# Patient Record
Sex: MALE | Race: WHITE | NOT HISPANIC OR LATINO | Employment: FULL TIME | URBAN - NONMETROPOLITAN AREA
[De-identification: names, ages, dates, MRNs, and addresses within clinical notes are randomized per-mention and may not be internally consistent; named-entity substitution may affect disease eponyms.]

---

## 2017-02-21 ENCOUNTER — OFFICE VISIT (OUTPATIENT)
Dept: INTERNAL MEDICINE | Facility: CLINIC | Age: 52
End: 2017-02-21

## 2017-02-21 VITALS
HEIGHT: 69 IN | DIASTOLIC BLOOD PRESSURE: 82 MMHG | SYSTOLIC BLOOD PRESSURE: 120 MMHG | WEIGHT: 188 LBS | RESPIRATION RATE: 14 BRPM | TEMPERATURE: 98.7 F | BODY MASS INDEX: 27.85 KG/M2 | OXYGEN SATURATION: 97 % | HEART RATE: 78 BPM

## 2017-02-21 DIAGNOSIS — M19.90 ARTHRITIS: ICD-10-CM

## 2017-02-21 DIAGNOSIS — G43.809 OTHER TYPE OF MIGRAINE: ICD-10-CM

## 2017-02-21 DIAGNOSIS — Z00.00 HEALTH CARE MAINTENANCE: ICD-10-CM

## 2017-02-21 DIAGNOSIS — I10 ESSENTIAL HYPERTENSION: ICD-10-CM

## 2017-02-21 DIAGNOSIS — N40.1 BENIGN NON-NODULAR PROSTATIC HYPERPLASIA WITH LOWER URINARY TRACT SYMPTOMS: ICD-10-CM

## 2017-02-21 DIAGNOSIS — K21.9 GASTROESOPHAGEAL REFLUX DISEASE WITHOUT ESOPHAGITIS: Primary | ICD-10-CM

## 2017-02-21 PROBLEM — G43.909 MIGRAINE HEADACHE: Status: ACTIVE | Noted: 2017-02-21

## 2017-02-21 PROCEDURE — 99203 OFFICE O/P NEW LOW 30 MIN: CPT | Performed by: INTERNAL MEDICINE

## 2017-02-21 NOTE — PROGRESS NOTES
Subjective   Prince Wesley is a 51 y.o. male.     Chief Complaint   Patient presents with   • Establish Care     New pt        History of Present Illness   Patient here to establish care.  Complaining heartburn off and on taking over-the-counter Nexium as needed helps.  Denies any nausea vomiting diarrhea.  History of hypertension blood pressure normal without medication now.  Weight is high.  Complaining decreased force of urination.  Family history of prostate cancer.  Also complains left arm numbness tingling with certain position denies any pain.    No current outpatient prescriptions on file.    The following portions of the patient's history were reviewed and updated as appropriate: allergies, current medications, past family history, past medical history, past social history, past surgical history and problem list.    Review of Systems   Constitutional: Negative.    Respiratory: Negative.    Cardiovascular: Negative.    Gastrointestinal: Negative.    Musculoskeletal: Negative.    Skin: Negative.    Neurological: Negative.    Psychiatric/Behavioral: Negative.        Objective   Physical Exam   Constitutional: He is oriented to person, place, and time. He appears well-nourished.   Neck: Neck supple.   Cardiovascular: Normal rate, regular rhythm and normal heart sounds.    Pulmonary/Chest: Effort normal and breath sounds normal.   Abdominal: Bowel sounds are normal.   Neurological: He is alert and oriented to person, place, and time.   Skin: Skin is warm.   Psychiatric: He has a normal mood and affect.       All tests have been reviewed.    Assessment/Plan   Diagnoses and all orders for this visit:    Gastroesophageal reflux disease without esophagitis, do lab     Essential hypertension diet for now    Other type of migraine watch    Benign non-nodular prostatic hyperplasia with lower urinary tract symptoms, FH prostate ca, do lab    Arthritis watch     schedule colon     Left arm numbness with certain position.  Watch posture.     Overweight , diet           1mo PE after labs

## 2017-11-17 ENCOUNTER — TELEPHONE (OUTPATIENT)
Dept: INTERNAL MEDICINE | Facility: CLINIC | Age: 52
End: 2017-11-17

## 2017-11-17 NOTE — TELEPHONE ENCOUNTER
Patient called the office requesting to see if he could get a refill on   Losartan 50mg  Twice daily   The patient stated that he will be out over the weekened of the Rx and would like to see if any could get sent to the pharmacy that is on file until his next appt.

## 2017-11-20 RX ORDER — LOSARTAN POTASSIUM 50 MG/1
50 TABLET ORAL DAILY
Qty: 60 TABLET | Refills: 0 | Status: SHIPPED | OUTPATIENT
Start: 2017-11-20 | End: 2018-01-25 | Stop reason: SDUPTHER

## 2018-01-25 ENCOUNTER — TELEPHONE (OUTPATIENT)
Dept: INTERNAL MEDICINE | Facility: CLINIC | Age: 53
End: 2018-01-25

## 2018-01-25 RX ORDER — LOSARTAN POTASSIUM 50 MG/1
50 TABLET ORAL DAILY
Qty: 60 TABLET | Refills: 0 | Status: SHIPPED | OUTPATIENT
Start: 2018-01-25 | End: 2018-05-15 | Stop reason: SDUPTHER

## 2018-05-15 RX ORDER — LOSARTAN POTASSIUM 50 MG/1
50 TABLET ORAL DAILY
Qty: 30 TABLET | Refills: 0 | Status: SHIPPED | OUTPATIENT
Start: 2018-05-15 | End: 2018-08-14 | Stop reason: SDUPTHER

## 2018-08-14 ENCOUNTER — OFFICE VISIT (OUTPATIENT)
Dept: INTERNAL MEDICINE | Facility: CLINIC | Age: 53
End: 2018-08-14

## 2018-08-14 VITALS
HEIGHT: 69 IN | SYSTOLIC BLOOD PRESSURE: 150 MMHG | WEIGHT: 196 LBS | HEART RATE: 90 BPM | BODY MASS INDEX: 29.03 KG/M2 | OXYGEN SATURATION: 96 % | TEMPERATURE: 98.2 F | DIASTOLIC BLOOD PRESSURE: 80 MMHG

## 2018-08-14 DIAGNOSIS — I10 ESSENTIAL HYPERTENSION: Primary | ICD-10-CM

## 2018-08-14 PROCEDURE — 99214 OFFICE O/P EST MOD 30 MIN: CPT | Performed by: INTERNAL MEDICINE

## 2018-08-14 RX ORDER — LOSARTAN POTASSIUM 50 MG/1
50 TABLET ORAL DAILY
Qty: 90 TABLET | Refills: 3 | Status: SHIPPED | OUTPATIENT
Start: 2018-08-14 | End: 2018-08-29

## 2018-08-14 RX ORDER — CITALOPRAM 10 MG/1
10 TABLET ORAL DAILY
Qty: 30 TABLET | Refills: 1 | Status: SHIPPED | OUTPATIENT
Start: 2018-08-14 | End: 2018-08-29 | Stop reason: SDUPTHER

## 2018-08-14 NOTE — PROGRESS NOTES
Subjective   Prince Wesley is a 52 y.o. male.     Chief Complaint   Patient presents with   • Hypertension   • Anxiety       Anxiety   Presents for initial visit. Onset was 1 to 5 years ago. The problem has been unchanged. Symptoms include excessive worry, irritability, nervous/anxious behavior and restlessness. Patient reports no chest pain, confusion, decreased concentration, depressed mood, dizziness, feeling of choking, obsessions, shortness of breath or suicidal ideas. Symptoms occur most days. The severity of symptoms is mild. The symptoms are aggravated by work stress. The quality of sleep is good. Nighttime awakenings: one to two.     Risk factors include alcohol intake and family history. Past treatments include nothing.      BP high, ran out medicine. No HA or soa.     Current Outpatient Prescriptions:   •  losartan (COZAAR) 50 MG tablet, Take 1 tablet by mouth Daily., Disp: 90 tablet, Rfl: 3    The following portions of the patient's history were reviewed and updated as appropriate: allergies, current medications, past family history, past medical history, past social history, past surgical history and problem list.    Review of Systems   Constitutional: Positive for irritability.   Respiratory: Negative.  Negative for shortness of breath.    Cardiovascular: Negative for chest pain.   Gastrointestinal: Negative.    Musculoskeletal: Negative.    Skin: Negative.    Neurological: Negative.  Negative for dizziness.   Psychiatric/Behavioral: Negative for confusion, decreased concentration and suicidal ideas. The patient is nervous/anxious.        Objective   Physical Exam   Constitutional: He is oriented to person, place, and time. He appears well-nourished.   Neck: Neck supple.   Cardiovascular: Normal rate, regular rhythm and normal heart sounds.    Pulmonary/Chest: Effort normal and breath sounds normal.   Abdominal: Bowel sounds are normal.   Neurological: He is alert and oriented to person, place, and time.    Skin: Skin is warm.   Psychiatric:   anxious       All tests have been reviewed.    Assessment/Plan   Diagnoses and all orders for this visit:    Essential hypertension  -     losartan (COZAAR) 50 MG tablet; Take 1 tablet by mouth Daily.              Gastroesophageal reflux disease without esophagitis, patient did not do lab      Essential hypertension continue med refill  Anxiety start celexa      Other type of migraine watch     Benign non-nodular prostatic hyperplasia with lower urinary tract symptoms, FH prostate ca, do lab     Arthritis watch    Left arm numbness with certain position. Watch posture.      Overweight , diet  2 week ,                 1mo PE after labs

## 2018-08-29 ENCOUNTER — OFFICE VISIT (OUTPATIENT)
Dept: INTERNAL MEDICINE | Facility: CLINIC | Age: 53
End: 2018-08-29

## 2018-08-29 VITALS
SYSTOLIC BLOOD PRESSURE: 160 MMHG | HEIGHT: 69 IN | OXYGEN SATURATION: 98 % | TEMPERATURE: 98.3 F | BODY MASS INDEX: 28.73 KG/M2 | WEIGHT: 194 LBS | HEART RATE: 78 BPM | RESPIRATION RATE: 14 BRPM | DIASTOLIC BLOOD PRESSURE: 98 MMHG

## 2018-08-29 DIAGNOSIS — M19.90 ARTHRITIS: ICD-10-CM

## 2018-08-29 DIAGNOSIS — I10 ESSENTIAL HYPERTENSION: Primary | ICD-10-CM

## 2018-08-29 DIAGNOSIS — N40.1 BENIGN NON-NODULAR PROSTATIC HYPERPLASIA WITH LOWER URINARY TRACT SYMPTOMS: ICD-10-CM

## 2018-08-29 DIAGNOSIS — K21.9 GASTROESOPHAGEAL REFLUX DISEASE WITHOUT ESOPHAGITIS: ICD-10-CM

## 2018-08-29 DIAGNOSIS — G43.809 OTHER MIGRAINE WITHOUT STATUS MIGRAINOSUS, NOT INTRACTABLE: ICD-10-CM

## 2018-08-29 PROCEDURE — 99214 OFFICE O/P EST MOD 30 MIN: CPT | Performed by: INTERNAL MEDICINE

## 2018-08-29 RX ORDER — LOSARTAN POTASSIUM AND HYDROCHLOROTHIAZIDE 25; 100 MG/1; MG/1
1 TABLET ORAL DAILY
Qty: 30 TABLET | Refills: 2 | Status: SHIPPED | OUTPATIENT
Start: 2018-08-29 | End: 2018-12-20 | Stop reason: SDUPTHER

## 2018-08-29 RX ORDER — CITALOPRAM 20 MG/1
20 TABLET ORAL DAILY
Qty: 30 TABLET | Refills: 2 | Status: SHIPPED | OUTPATIENT
Start: 2018-08-29 | End: 2018-12-20 | Stop reason: SDUPTHER

## 2018-08-29 NOTE — PROGRESS NOTES
Subjective   Prince Wesley is a 53 y.o. male.     Chief Complaint   Patient presents with   • Follow-up     on meds        History of Present Illness   GERD patient yet to do blood tests.  Hypertension on medication blood pressure still elevated.  Anxiety on medication helps some.  Migraine headache is stable.  BPH is stable.  Arthritis joint pain from time to time.  Weight is still elevated.    Current Outpatient Prescriptions:   •  citalopram (CELEXA) 10 MG tablet, Take 1 tablet by mouth Daily., Disp: 30 tablet, Rfl: 1  •  losartan (COZAAR) 50 MG tablet, Take 1 tablet by mouth Daily., Disp: 90 tablet, Rfl: 3    The following portions of the patient's history were reviewed and updated as appropriate: allergies, current medications, past family history, past medical history, past social history, past surgical history and problem list.    Review of Systems   Constitutional: Negative.    Respiratory: Negative.    Cardiovascular: Negative.    Gastrointestinal: Negative.    Skin: Negative.    Psychiatric/Behavioral: The patient is nervous/anxious.        Objective   Physical Exam   Constitutional: He is oriented to person, place, and time. He appears well-developed and well-nourished.   Neck: Neck supple.   Cardiovascular: Normal rate, regular rhythm and normal heart sounds.    Pulmonary/Chest: Effort normal and breath sounds normal.   Abdominal: Soft. Bowel sounds are normal.   Neurological: He is alert and oriented to person, place, and time.   Psychiatric: He has a normal mood and affect. His behavior is normal.       All tests have been reviewed.    Assessment/Plan   There are no diagnoses linked to this encounter.           Gastroesophageal reflux disease without esophagitis, patient did not do lab      Essential hypertension change to losartan /25 from losartan 50  Anxiety celexa increased to 20 mg     Other type of migraine watch     Benign non-nodular prostatic hyperplasia with lower urinary tract symptoms,  FH prostate ca, do lab     Arthritis watch     Left arm numbness with certain position. Watch posture.      Overweight , diet  4 week ,PE after lab

## 2018-12-20 RX ORDER — LOSARTAN POTASSIUM AND HYDROCHLOROTHIAZIDE 25; 100 MG/1; MG/1
1 TABLET ORAL DAILY
Qty: 30 TABLET | Refills: 2 | Status: SHIPPED | OUTPATIENT
Start: 2018-12-20 | End: 2019-10-22

## 2018-12-20 RX ORDER — CITALOPRAM 20 MG/1
20 TABLET ORAL DAILY
Qty: 30 TABLET | Refills: 2 | Status: SHIPPED | OUTPATIENT
Start: 2018-12-20 | End: 2019-10-22 | Stop reason: SDDI

## 2018-12-20 NOTE — TELEPHONE ENCOUNTER
Patient lvm stating he will not be able to make an appt. Until Feb.  requesting refills to last until then.

## 2019-10-22 ENCOUNTER — RESULTS ENCOUNTER (OUTPATIENT)
Dept: FAMILY MEDICINE CLINIC | Facility: CLINIC | Age: 54
End: 2019-10-22

## 2019-10-22 ENCOUNTER — OFFICE VISIT (OUTPATIENT)
Dept: FAMILY MEDICINE CLINIC | Facility: CLINIC | Age: 54
End: 2019-10-22

## 2019-10-22 VITALS
DIASTOLIC BLOOD PRESSURE: 102 MMHG | BODY MASS INDEX: 30.66 KG/M2 | SYSTOLIC BLOOD PRESSURE: 172 MMHG | OXYGEN SATURATION: 99 % | WEIGHT: 207 LBS | TEMPERATURE: 98.6 F | HEART RATE: 83 BPM | HEIGHT: 69 IN

## 2019-10-22 DIAGNOSIS — Z12.11 COLON CANCER SCREENING: ICD-10-CM

## 2019-10-22 DIAGNOSIS — I10 UNCONTROLLED HYPERTENSION: Primary | ICD-10-CM

## 2019-10-22 DIAGNOSIS — L50.1 IDIOPATHIC URTICARIA: ICD-10-CM

## 2019-10-22 DIAGNOSIS — G43.109 MIGRAINE WITH AURA AND WITHOUT STATUS MIGRAINOSUS, NOT INTRACTABLE: ICD-10-CM

## 2019-10-22 DIAGNOSIS — Z13.220 SCREENING FOR LIPID DISORDERS: ICD-10-CM

## 2019-10-22 DIAGNOSIS — R06.83 SNORING: ICD-10-CM

## 2019-10-22 DIAGNOSIS — R12 HEARTBURN: ICD-10-CM

## 2019-10-22 DIAGNOSIS — Z13.1 SCREENING FOR DIABETES MELLITUS: ICD-10-CM

## 2019-10-22 DIAGNOSIS — Z12.5 SCREENING FOR PROSTATE CANCER: ICD-10-CM

## 2019-10-22 DIAGNOSIS — Z80.42 FAMILY HISTORY OF PROSTATE CANCER IN FATHER: ICD-10-CM

## 2019-10-22 DIAGNOSIS — Z28.21 INFLUENZA VACCINATION DECLINED BY PATIENT: ICD-10-CM

## 2019-10-22 PROCEDURE — 99204 OFFICE O/P NEW MOD 45 MIN: CPT | Performed by: FAMILY MEDICINE

## 2019-10-22 RX ORDER — VALSARTAN AND HYDROCHLOROTHIAZIDE 160; 25 MG/1; MG/1
1 TABLET ORAL DAILY
Qty: 30 TABLET | Refills: 5 | Status: SHIPPED | OUTPATIENT
Start: 2019-10-22 | End: 2020-03-04 | Stop reason: SDUPTHER

## 2019-10-22 NOTE — PROGRESS NOTES
Subjective   Prince Wesley is a 54 y.o. male.     History of Present Illness  Mr. Wesley presents today as a new pt to establish care. He is in need of medication refills.    He has essential hypertension, dx'd few years ago. Previously on Hyzaar which worked well but not had refills in approx 3 months. BP has run as high as 190s systolic. Not getting regular exercise. Not following heart healthy diet. Denies side effects on hyzaar. Is concerned about supply with recent recalls. Denies h/o CHF, CAD, dysrhythmia, valvular disease. Denies CP, palpitations, swelling. Does have mild DA SILVA which has been stable.    He snores loudly, was told previously he may need a sleep study few years ago but was not able to afford the deductible at the time.    Has family h/o prostate cancer in father and PGF. Has seen Dr. Moffett in past had PSA, SYLVIA. Been over a year since last PSA. Denies obstructive symptoms.    He is overdue for colon cancer screening. Wishes to avoid colonoscopy if possible as first step.    He was previously on celexa for situational anxiety. Not been taking for at least 3 months. Feels he is doing well without it at this time. Denies depression.    Has had migraine with aura in past. Began approx 15 yrs ago. Has less than 1 per year. Generally with scintillating scotomata. Uses tylenol, NSAID with good response. No assoc'd focal neuro symptoms.    Has had recurrent urticaria with unknown trigger. Was previously assoc'd with increased stress, but not always. tx'd with benadryl, steroids and does well. Denies h/o angioedema, anaphylaxis. Does believe it began after starting ARB for HTN.    Has heartburn for which he is taking PPI every 3-4 days as needed. No dysphagia, weight loss, blood in stool or abd pain.    The following portions of the patient's history were reviewed and updated as appropriate: allergies, current medications, past family history, past medical history, past social history, past surgical history  and problem list.    Review of Systems   Constitutional: Negative for appetite change, fatigue, fever and unexpected weight change.   HENT: Negative for congestion, ear pain, hearing loss, nosebleeds, rhinorrhea, sneezing, sore throat, tinnitus and trouble swallowing.    Eyes: Positive for visual disturbance. Negative for pain, discharge and redness.   Respiratory: Positive for shortness of breath (with exertion). Negative for cough, chest tightness and wheezing.    Cardiovascular: Negative for chest pain, palpitations and leg swelling.   Gastrointestinal: Negative for abdominal pain, blood in stool, constipation, diarrhea, nausea and vomiting.        Heartburn   Endocrine: Negative for cold intolerance, heat intolerance, polydipsia and polyuria.   Genitourinary: Negative for dysuria, flank pain, frequency, hematuria and urgency.   Musculoskeletal: Positive for arthralgias. Negative for back pain, joint swelling, myalgias and neck pain.   Skin: Negative for rash and wound.   Neurological: Negative for dizziness, tremors, seizures, syncope, speech difficulty, weakness, numbness and headaches.   Hematological: Negative for adenopathy. Does not bruise/bleed easily.   Psychiatric/Behavioral: Negative for confusion, dysphoric mood and sleep disturbance. The patient is nervous/anxious.        Objective    Vitals:    10/22/19 0938   BP: (!) 172/102   Pulse: 83   Temp: 98.6 °F (37 °C)   SpO2: 99%     Body mass index is 30.57 kg/m².      10/22/19  0938   Weight: 93.9 kg (207 lb)       Physical Exam   Constitutional: He is oriented to person, place, and time. He appears well-developed and well-nourished. He is cooperative. He does not appear ill. No distress.   HENT:   Head: Normocephalic and atraumatic.   Right Ear: Tympanic membrane, external ear and ear canal normal.   Left Ear: Tympanic membrane, external ear and ear canal normal.   Nose: Nose normal. No mucosal edema or rhinorrhea.   Mouth/Throat: Oropharynx is clear and  moist and mucous membranes are normal. No oral lesions. No posterior oropharyngeal erythema.   Mallampati Class 3   Eyes: Conjunctivae, EOM and lids are normal.   Neck: Phonation normal. Neck supple. Normal carotid pulses present. Carotid bruit is not present. No thyromegaly present.   Cardiovascular: Normal rate, regular rhythm, S1 normal, S2 normal and intact distal pulses. Exam reveals no gallop.   No murmur heard.  Pulmonary/Chest: Effort normal and breath sounds normal.   Abdominal: Soft. Bowel sounds are normal. He exhibits no distension and no mass. There is no hepatosplenomegaly. There is no tenderness.   Musculoskeletal: Normal range of motion. He exhibits no edema, tenderness or deformity.     Vascular Status -  His right foot exhibits no edema. His left foot exhibits no edema.  Lymphadenopathy:     He has no cervical adenopathy.   Neurological: He is alert and oriented to person, place, and time. He has normal strength. He displays no tremor. No cranial nerve deficit or sensory deficit. Gait normal.   Skin: Skin is warm and dry. No ecchymosis and no rash noted. No cyanosis. Nails show no clubbing.   Psychiatric: He has a normal mood and affect. His speech is normal and behavior is normal. Judgment and thought content normal. Cognition and memory are normal.   Good eye contact. Answers questions appropriately. Good personal hygiene and grooming.   Nursing note and vitals reviewed.      Assessment/Plan   Prince was seen today for establish care and hypertension.    Diagnoses and all orders for this visit:    Uncontrolled hypertension  -     valsartan-hydrochlorothiazide (DIOVAN HCT) 160-25 MG per tablet; Take 1 tablet by mouth Daily.  -     CBC (No Diff); Future  -     Comprehensive Metabolic Panel; Future  -     TSH Rfx On Abnormal To Free T4; Future  -     Home Sleep Study; Future    Snoring  -     Home Sleep Study; Future    Screening for lipid disorders  -     Lipid Panel; Future    Screening for  diabetes mellitus  -     Hemoglobin A1c; Future    Family history of prostate cancer in father  -     PSA Screen; Future    Screening for prostate cancer  -     PSA Screen; Future    Colon cancer screening  -     Cologuard - Stool, Per Rectum; Future    Heartburn    Migraine with aura and without status migrainosus, not intractable    Idiopathic urticaria    Influenza vaccination declined by patient       Uncontrolled HTN as he has been off ARB/thiazide combo for few months. Restart with Diovan HCT due to shortage of Hyzaar. I suspect he may have comorbid JULIET. Home sleep study as above. Discussed need for heart healthy eating, regular aerobic exercise and limiting alcohol intake to no more than 2 servings per day.     Heartburn well controlled on H2 blocker or PPI every few days as needed. No red flag symptoms. Risks/benefits and potential side effects reviewed.    Migraine with aura managed well with prn analgesics. Headaches very infrequent at this time. No focal neuro symptoms.    Recurrent urticaria of unknown cause. Possible related to ARB use so this will have to be closely monitored. Continue benadryl and corticosteroids, H2 blockers for recurrence.    Return in 1 month for BP recheck. F/u sooner as needed/instructed.  I will contact patient regarding test results and provide instructions regarding any necessary changes in plan of care.  Patient was encouraged to keep me informed of any acute changes, lack of improvement, or any new concerning symptoms.  Pt is aware of reasons to seek emergent care.  Patient voiced understanding of all instructions and denied further questions.

## 2019-10-23 ENCOUNTER — RESULTS ENCOUNTER (OUTPATIENT)
Dept: FAMILY MEDICINE CLINIC | Facility: CLINIC | Age: 54
End: 2019-10-23

## 2019-10-23 DIAGNOSIS — Z80.42 FAMILY HISTORY OF PROSTATE CANCER IN FATHER: ICD-10-CM

## 2019-10-23 DIAGNOSIS — I10 UNCONTROLLED HYPERTENSION: ICD-10-CM

## 2019-10-23 DIAGNOSIS — Z13.220 SCREENING FOR LIPID DISORDERS: ICD-10-CM

## 2019-10-23 DIAGNOSIS — Z13.1 SCREENING FOR DIABETES MELLITUS: ICD-10-CM

## 2019-10-23 DIAGNOSIS — Z12.5 SCREENING FOR PROSTATE CANCER: ICD-10-CM

## 2019-12-02 ENCOUNTER — TELEPHONE (OUTPATIENT)
Dept: FAMILY MEDICINE CLINIC | Facility: CLINIC | Age: 54
End: 2019-12-02

## 2019-12-02 DIAGNOSIS — N20.0 KIDNEY STONE: Primary | ICD-10-CM

## 2019-12-02 NOTE — TELEPHONE ENCOUNTER
Pt called. Sts he wants to come in later this week to have labs drawn before his appt on the 16th (orders already in Epic) - but would like to have a urine test added to the labs if possible.  Sts that he thinks he may have passed a kidney stone this weekend (had pain, blood in urine, etc), but is feeling better now.  He wants to have the UA to check to be sure everything is ok. Please advise.

## 2019-12-04 ENCOUNTER — RESULTS ENCOUNTER (OUTPATIENT)
Dept: FAMILY MEDICINE CLINIC | Facility: CLINIC | Age: 54
End: 2019-12-04

## 2019-12-04 DIAGNOSIS — N20.0 KIDNEY STONE: ICD-10-CM

## 2019-12-04 LAB
ALBUMIN SERPL-MCNC: 4.8 G/DL (ref 3.5–5.2)
ALBUMIN/GLOB SERPL: 1.7 G/DL
ALP SERPL-CCNC: 70 U/L (ref 39–117)
ALT SERPL-CCNC: 32 U/L (ref 1–41)
AST SERPL-CCNC: 16 U/L (ref 1–40)
BILIRUB SERPL-MCNC: 0.5 MG/DL (ref 0.2–1.2)
BUN SERPL-MCNC: 10 MG/DL (ref 6–20)
BUN/CREAT SERPL: 11.4 (ref 7–25)
CALCIUM SERPL-MCNC: 9.6 MG/DL (ref 8.6–10.5)
CHLORIDE SERPL-SCNC: 101 MMOL/L (ref 98–107)
CHOLEST SERPL-MCNC: 240 MG/DL (ref 0–200)
CO2 SERPL-SCNC: 27.2 MMOL/L (ref 22–29)
CREAT SERPL-MCNC: 0.88 MG/DL (ref 0.76–1.27)
ERYTHROCYTE [DISTWIDTH] IN BLOOD BY AUTOMATED COUNT: 14 % (ref 12.3–15.4)
GLOBULIN SER CALC-MCNC: 2.8 GM/DL
GLUCOSE SERPL-MCNC: 100 MG/DL (ref 65–99)
HBA1C MFR BLD: 6.3 % (ref 4.8–5.6)
HCT VFR BLD AUTO: 51 % (ref 37.5–51)
HDLC SERPL-MCNC: 48 MG/DL (ref 40–60)
HGB BLD-MCNC: 16.6 G/DL (ref 13–17.7)
LDLC SERPL CALC-MCNC: 152 MG/DL (ref 0–100)
MCH RBC QN AUTO: 27.4 PG (ref 26.6–33)
MCHC RBC AUTO-ENTMCNC: 32.5 G/DL (ref 31.5–35.7)
MCV RBC AUTO: 84.3 FL (ref 79–97)
PLATELET # BLD AUTO: 270 10*3/MM3 (ref 140–450)
POTASSIUM SERPL-SCNC: 4.8 MMOL/L (ref 3.5–5.2)
PROT SERPL-MCNC: 7.6 G/DL (ref 6–8.5)
PSA SERPL-MCNC: 7.22 NG/ML (ref 0–4)
RBC # BLD AUTO: 6.05 10*6/MM3 (ref 4.14–5.8)
SODIUM SERPL-SCNC: 141 MMOL/L (ref 136–145)
TRIGL SERPL-MCNC: 198 MG/DL (ref 0–150)
TSH SERPL DL<=0.005 MIU/L-ACNC: 2.62 UIU/ML (ref 0.27–4.2)
VLDLC SERPL CALC-MCNC: 39.6 MG/DL
WBC # BLD AUTO: 6.73 10*3/MM3 (ref 3.4–10.8)

## 2019-12-17 ENCOUNTER — OFFICE VISIT (OUTPATIENT)
Dept: FAMILY MEDICINE CLINIC | Facility: CLINIC | Age: 54
End: 2019-12-17

## 2019-12-17 ENCOUNTER — RESULTS ENCOUNTER (OUTPATIENT)
Dept: FAMILY MEDICINE CLINIC | Facility: CLINIC | Age: 54
End: 2019-12-17

## 2019-12-17 VITALS
WEIGHT: 204 LBS | OXYGEN SATURATION: 99 % | HEIGHT: 69 IN | BODY MASS INDEX: 30.21 KG/M2 | HEART RATE: 79 BPM | SYSTOLIC BLOOD PRESSURE: 124 MMHG | DIASTOLIC BLOOD PRESSURE: 86 MMHG | TEMPERATURE: 98.6 F

## 2019-12-17 DIAGNOSIS — R31.0 GROSS HEMATURIA: ICD-10-CM

## 2019-12-17 DIAGNOSIS — R97.20 ELEVATED PSA, LESS THAN 10 NG/ML: ICD-10-CM

## 2019-12-17 DIAGNOSIS — I10 ESSENTIAL HYPERTENSION: ICD-10-CM

## 2019-12-17 DIAGNOSIS — Z80.42 FAMILY HISTORY OF PROSTATE CANCER IN FATHER: ICD-10-CM

## 2019-12-17 DIAGNOSIS — E78.2 MIXED HYPERLIPIDEMIA: ICD-10-CM

## 2019-12-17 DIAGNOSIS — R73.03 PREDIABETES: ICD-10-CM

## 2019-12-17 DIAGNOSIS — I10 ESSENTIAL HYPERTENSION: Primary | ICD-10-CM

## 2019-12-17 PROCEDURE — 99214 OFFICE O/P EST MOD 30 MIN: CPT | Performed by: FAMILY MEDICINE

## 2019-12-17 NOTE — PROGRESS NOTES
Subjective   Prince Wesley is a 54 y.o. male.     History of Present Illness   Mr. Wesley presents today for recheck BP. Restarted on meds for HTN last visit. Taking as rx'd. Denies side effects. BP under better control.    Since last visit he performed cologuard test which was normal.    FLP showed high cholesterol and TGs. He does not get regular exercise. Not following heart healthy diet.    He was found to have pre-DM. A1c high. No known family h/o diabetes.    PSA also noted to be elevated. Saw Dr. Moffett over 10 years ago for very mildly elevated PSA, believes just over 4. Father with h/o prostate cancer. He has had once episode of gross hematuria but believes he had a kidney stone as it was assoc'd with pain. No recurrence.     The following portions of the patient's history were reviewed and updated as appropriate: allergies, current medications, past family history, past medical history, past social history, past surgical history and problem list.    Review of Systems   Constitutional: Negative for appetite change, fatigue, fever and unexpected weight change.   HENT: Negative for congestion, ear pain, hearing loss, nosebleeds, rhinorrhea, sneezing, sore throat, tinnitus and trouble swallowing.    Eyes: Positive for visual disturbance. Negative for pain, discharge and redness.   Respiratory: Positive for shortness of breath (with exertion). Negative for cough, chest tightness and wheezing.    Cardiovascular: Negative for chest pain, palpitations and leg swelling.   Gastrointestinal: Negative for abdominal pain, blood in stool, constipation, diarrhea, nausea and vomiting.        Heartburn   Endocrine: Negative for cold intolerance, heat intolerance, polydipsia and polyuria.   Genitourinary: Negative for decreased urine volume, difficulty urinating, dysuria, flank pain, frequency, hematuria and urgency.   Musculoskeletal: Positive for arthralgias. Negative for back pain, joint swelling, myalgias and neck pain.    Skin: Negative for rash and wound.   Neurological: Negative for dizziness, tremors, seizures, syncope, speech difficulty, weakness, numbness and headaches.   Hematological: Negative for adenopathy. Does not bruise/bleed easily.   Psychiatric/Behavioral: Negative for confusion, dysphoric mood and sleep disturbance. The patient is nervous/anxious.    Pt's previous ROS reviewed and updated as indicated.       Objective    Vitals:    12/17/19 0858   BP: 124/86   Pulse: 79   Temp: 98.6 °F (37 °C)   SpO2: 99%     Body mass index is 30.13 kg/m².      12/17/19  0858   Weight: 92.5 kg (204 lb)     Physical Exam   Constitutional: He is oriented to person, place, and time. He appears well-developed and well-nourished. He is cooperative. He does not appear ill. No distress.   Neck: Carotid bruit is not present.   Cardiovascular: Normal rate, regular rhythm, normal heart sounds and intact distal pulses. Exam reveals no gallop.   No murmur heard.  Pulmonary/Chest: Effort normal and breath sounds normal.   Neurological: He is alert and oriented to person, place, and time. Gait normal.   Psychiatric: He has a normal mood and affect. His behavior is normal.   Nursing note and vitals reviewed.    Lab Results   Component Value Date    WBC 6.73 12/03/2019    HGB 16.6 12/03/2019    HCT 51.0 12/03/2019    MCV 84.3 12/03/2019     12/03/2019       Lab Results   Component Value Date    BUN 10 12/03/2019    CREATININE 0.88 12/03/2019    EGFRIFNONA 91 12/03/2019    EGFRIFAFRI 110 12/03/2019    BCR 11.4 12/03/2019    K 4.8 12/03/2019    CO2 27.2 12/03/2019    CALCIUM 9.6 12/03/2019    PROTENTOTREF 7.6 12/03/2019    ALBUMIN 4.80 12/03/2019    LABIL2 1.7 12/03/2019    AST 16 12/03/2019    ALT 32 12/03/2019       Lab Results   Component Value Date    CHLPL 240 (H) 12/03/2019    TRIG 198 (H) 12/03/2019    HDL 48 12/03/2019     (H) 12/03/2019       Lab Results   Component Value Date    HGBA1C 6.30 (H) 12/03/2019       Lab Results    Component Value Date    TSH 2.620 12/03/2019       Lab Results   Component Value Date    PSA 7.220 (H) 12/03/2019         Assessment/Plan   Prince was seen today for hypertension.    Diagnoses and all orders for this visit:    Essential hypertension  -     Microalbumin / Creatinine Urine Ratio - Urine, Clean Catch; Future    Prediabetes    Mixed hyperlipidemia    Elevated PSA, less than 10 ng/ml  -     Ambulatory Referral to Urology    Family history of prostate cancer in father  -     Ambulatory Referral to Urology    Gross hematuria  -     Urinalysis With Microscopic If Indicated (No Culture) - Urine, Clean Catch; Future  -     Ambulatory Referral to Urology      HTN now under control since restarting BP meds, continue diovan HCT. HLP but not currently in statin benefit group. Continue close monitoring. Pt advised to eat a heart healthy diet and get regular aerobic exercise.    In order to prevent diabetes, he is encouraged to:  1) increase physical activity to 150 min per week  2) avoid fast foods, junk foods, sweets, sugar beverages  3) increase vegetables, lean proteins, whole grains and fruits    Refer to urology for further eval of elevated PSA. Return for u/a as he does feel he has time to leave sample today.    Routine f/u in 6 months, sooner as needed/instructed.  Patient was encouraged to keep me informed of any acute changes, lack of improvement, or any new concerning symptoms.  Pt is aware of reasons to seek emergent care.  Patient voiced understanding of all instructions and denied further questions.        I spent 25 minutes face to face with patient with > 50% of the face to face time spent in counseling patient regarding diagnostic results, impressions, prognosis, instructions for management, risk factor reductions, education, and importance of treatment compliance.

## 2020-01-24 ENCOUNTER — OFFICE VISIT (OUTPATIENT)
Dept: UROLOGY | Facility: CLINIC | Age: 55
End: 2020-01-24

## 2020-01-24 VITALS
BODY MASS INDEX: 30.21 KG/M2 | HEART RATE: 91 BPM | TEMPERATURE: 97.8 F | OXYGEN SATURATION: 97 % | WEIGHT: 204 LBS | HEIGHT: 69 IN

## 2020-01-24 DIAGNOSIS — R31.0 GROSS HEMATURIA: ICD-10-CM

## 2020-01-24 DIAGNOSIS — A63.0 CONDYLOMA ACUMINATA: ICD-10-CM

## 2020-01-24 DIAGNOSIS — R97.20 ELEVATED PROSTATE SPECIFIC ANTIGEN (PSA): Primary | ICD-10-CM

## 2020-01-24 LAB
BILIRUB BLD-MCNC: NEGATIVE MG/DL
CLARITY, POC: CLEAR
COLOR UR: YELLOW
GLUCOSE UR STRIP-MCNC: NEGATIVE MG/DL
KETONES UR QL: NEGATIVE
LEUKOCYTE EST, POC: NEGATIVE
NITRITE UR-MCNC: NEGATIVE MG/ML
PH UR: 7 [PH] (ref 5–8)
PROT UR STRIP-MCNC: NEGATIVE MG/DL
RBC # UR STRIP: NEGATIVE /UL
SP GR UR: 1.01 (ref 1–1.03)
UROBILINOGEN UR QL: NORMAL

## 2020-01-24 PROCEDURE — 99244 OFF/OP CNSLTJ NEW/EST MOD 40: CPT | Performed by: UROLOGY

## 2020-01-24 PROCEDURE — 81003 URINALYSIS AUTO W/O SCOPE: CPT | Performed by: UROLOGY

## 2020-01-24 NOTE — PROGRESS NOTES
Chief Complaint  Elevated PSA    Referring Provider  Amy Thomas MD    HPI  Mr. Wesley is a 54 y.o.  male who presents with an elevated PSA to 7.2.  He does have a family history of prostate cancer.  He saw Dr. Moffett 10 + years ago. Never had biopsy.    Patient complains of nocturia x1, gross hematuria. He thinks he had a kidney stone when PSA was taken. He denies infection, but had dysuria and urgency at that time.  Patient denies any urinary Sx today.    Patient denies fevers, chills, nausea, vomiting, constipation, or flank pain.    Past  history is negative for BPH, frequent UTIs, or other renal diseases.     He denies shortness of breath, leg swelling, calf pain or bone pain.    He is .  The penile lesions developed after his divorce, but no new sexual partners.  He denies any urethral discharge or burning today.      IPSS Questionnaire (AUA-7):  Over the past month…    1)  Incomplete Emptying  How often have you had a sensation of not emptying your bladder?  0 - Not at all   2)  Frequency  How often have you had to urinate less than every two hours? 1 - Less than 1 time in 5   3)  Intermittency  How often have you found you stopped and started again several times when you urinated?  0 - Not at all   4) Urgency  How often have you found it difficult to postpone urination?  1 - Less than 1 time in 5   5) Weak Stream  How often have you had a weak urinary stream?  1 - Less than 1 time in 5   6) Straining  How often have you had to push or strain to begin urination?  0 - Not at all   7) Nocturia  How many times did you typically get up at night to urinate?  1 - 1 time   Total Score:  4       Quality of life due to urinary symptoms:  If you were to spend the rest of your life with your urinary condition the way it is now, how would you feel about that? 2-Mostly Satisfied   Urine Leakage (Incontinence) 0-No Leakage     Past Medical History  Past Medical History:   Diagnosis Date   • GERD  "(gastroesophageal reflux disease)    • Headache    • Hypertension        Past Surgical History  Past Surgical History:   Procedure Laterality Date   • SQUAMOUS CELL CARCINOMA EXCISION N/A 3-4 years ago   • WISDOM TOOTH EXTRACTION         Medications    Current Outpatient Medications:   •  valsartan-hydrochlorothiazide (DIOVAN HCT) 160-25 MG per tablet, Take 1 tablet by mouth Daily., Disp: 30 tablet, Rfl: 5    Allergies  No Known Allergies    Social History  Social History     Tobacco Use   • Smoking status: Never Smoker   • Smokeless tobacco: Never Used   Substance Use Topics   • Alcohol use: Yes     Frequency: 4 or more times a week     Drinks per session: 1 or 2   • Drug use: No       Family History  Family History   Problem Relation Age of Onset   • Skin cancer Father    • Prostate cancer Father    • Prostate cancer Paternal Grandfather         Review of Systems  Constitutional: No fevers or chills  Skin: Negative for rash  Endocrine: No heat/cold intolerance   Cardiovascular: Negative for chest pain or dyspnea on exertion  Respiratory: Negative for shortness of breath or wheezing  Gastrointestinal: No constipation, nausea or vomiting  Genitourinary: Negative for new lower urinary tract symptoms, current gross hematuria or dysuria.  Musculoskeletal: No flank pain  Neurological:  Negative for frequent headaches or dizziness  Lymph/Heme: Negative for leg swelling or calf pain.    Physical Exam  Visit Vitals  Pulse 91   Temp 97.8 °F (36.6 °C)   Ht 175.3 cm (69.02\")   Wt 92.5 kg (204 lb)   SpO2 97%   BMI 30.11 kg/m²     Constitutional: NAD, WDWN.   HEENT: NCAT. Conjunctivae normal.  MMM.    Cardiovascular: Regular rate.  Pulmonary/Chest: Respirations are even and non-labored bilaterally.  Abdominal: Soft. No distension, tenderness, masses or guarding. No CVA tenderness.  Neurological: A + O x 3.  Cranial Nerves II-XII grossly intact. Normal gait.  Extremities: ANTHONY x 4, Warm. No clubbing.  No cyanosis.    Skin: Pink, " warm and dry.  No rashes noted.    Genitourinary  Penis: circumcised penis, glans normal, no penile discharge.  Positive condyloma on dorsum of the shaft of the penis, as well as the left ventral side.  3-4 in total  Testes: descended bilaterally, no masses, nontender to palpation. Remainder of scrotal contents normal. No hernia appreciated.  Perineum:  No perineal pain with palpation.  Rectal:  Normal tone, no masses.  Prostate:  35 grams.  Symmetric, non-tender, anodular and no induration.      Labs  Brief Urine Lab Results     None          Lab Results   Component Value Date    PSA 7.220 (H) 12/03/2019         Assessment  Mr. Wesley is a 54 y.o.  male who presents with elevated PSA; he had gross hematuria and urgency at that time when the PSA was drawn.  He also has condyloma of the penile shaft skin.    I explained to the patient that an elevated PSA is a marker of risk of prostate cancer and a prostate biopsy would be the next step in diagnosis. I explained that sampling error can occur with any biopsy and there is a risk of potentially missing a cancer that may be present.  Since he had gross hematuria and some urinary symptoms at the time the PSA was checked, it may have been falsely elevated due to infection.  We will recheck a more sophisticated test as well as work-up his gross hematuria, since his absolutely certain that it was an infection.    I discussed the risks, benefits, and alternatives to prostate biopsy, including hematuria, hematochezia, and hematospermia.  I also discussed the risk of diagnosing a clinically-insignificant prostate cancer.  I discussed the risks of sepsis, which can be minimized by prophylactic antibiotics.       Plan  1. 4k score and CT urogram  2. FU in 2 weeks   3. Likely cystoscopy and TRUS Bx in future  4.  Offered him in office excisional biopsy of the condyloma in the future after we have performed the rest of his work-up.    No follow-ups on file.    Ryley  Gera Bobby MD

## 2020-01-29 ENCOUNTER — HOSPITAL ENCOUNTER (OUTPATIENT)
Dept: CT IMAGING | Facility: HOSPITAL | Age: 55
Discharge: HOME | End: 2020-01-29
Admitting: UROLOGY

## 2020-01-29 DIAGNOSIS — R31.0 GROSS HEMATURIA: ICD-10-CM

## 2020-01-29 LAB — CREAT BLDA-MCNC: 1.1 MG/DL (ref 0.6–1.3)

## 2020-01-29 PROCEDURE — 74178 CT ABD&PLV WO CNTR FLWD CNTR: CPT

## 2020-01-29 PROCEDURE — 25010000002 IOPAMIDOL 61 % SOLUTION: Performed by: UROLOGY

## 2020-01-29 PROCEDURE — 82565 ASSAY OF CREATININE: CPT

## 2020-01-29 RX ADMIN — IOPAMIDOL 100 ML: 612 INJECTION, SOLUTION INTRAVENOUS at 07:30

## 2020-02-10 ENCOUNTER — OFFICE VISIT (OUTPATIENT)
Dept: UROLOGY | Facility: CLINIC | Age: 55
End: 2020-02-10

## 2020-02-10 DIAGNOSIS — R97.20 ELEVATED PROSTATE SPECIFIC ANTIGEN (PSA): Primary | ICD-10-CM

## 2020-02-10 PROCEDURE — 99213 OFFICE O/P EST LOW 20 MIN: CPT | Performed by: UROLOGY

## 2020-02-10 RX ORDER — MAGNESIUM HYDROXIDE 1200 MG/15ML
1 LIQUID ORAL ONCE
Qty: 1 ENEMA | Refills: 0 | Status: SHIPPED | OUTPATIENT
Start: 2020-02-10 | End: 2020-02-10

## 2020-02-10 RX ORDER — CIPROFLOXACIN 500 MG/1
500 TABLET, FILM COATED ORAL 2 TIMES DAILY
Qty: 6 TABLET | Refills: 0 | Status: SHIPPED | OUTPATIENT
Start: 2020-02-10 | End: 2020-06-22

## 2020-02-10 RX ORDER — CEPHALEXIN 500 MG/1
500 CAPSULE ORAL 2 TIMES DAILY
Qty: 6 CAPSULE | Refills: 0 | Status: SHIPPED | OUTPATIENT
Start: 2020-02-10 | End: 2020-02-13

## 2020-03-04 DIAGNOSIS — I10 UNCONTROLLED HYPERTENSION: ICD-10-CM

## 2020-03-04 RX ORDER — VALSARTAN AND HYDROCHLOROTHIAZIDE 160; 25 MG/1; MG/1
1 TABLET ORAL DAILY
Qty: 30 TABLET | Refills: 5 | Status: SHIPPED | OUTPATIENT
Start: 2020-03-04 | End: 2020-06-22 | Stop reason: SDUPTHER

## 2020-03-04 NOTE — TELEPHONE ENCOUNTER
Pt stopped by office req refills on his valsartan 160-25mg. Please send refills to [facility] in [place].

## 2020-03-11 RX ORDER — CITALOPRAM 20 MG/1
20 TABLET ORAL DAILY
Qty: 30 TABLET | Refills: 5 | Status: SHIPPED | OUTPATIENT
Start: 2020-03-11 | End: 2020-06-22 | Stop reason: SDUPTHER

## 2020-06-11 ENCOUNTER — TELEPHONE (OUTPATIENT)
Dept: FAMILY MEDICINE CLINIC | Facility: CLINIC | Age: 55
End: 2020-06-11

## 2020-06-11 ENCOUNTER — RESULTS ENCOUNTER (OUTPATIENT)
Dept: FAMILY MEDICINE CLINIC | Facility: CLINIC | Age: 55
End: 2020-06-11

## 2020-06-11 DIAGNOSIS — R73.03 PREDIABETES: ICD-10-CM

## 2020-06-11 DIAGNOSIS — I10 ESSENTIAL HYPERTENSION: ICD-10-CM

## 2020-06-11 DIAGNOSIS — E78.2 MIXED HYPERLIPIDEMIA: ICD-10-CM

## 2020-06-11 DIAGNOSIS — R73.03 PREDIABETES: Primary | ICD-10-CM

## 2020-06-11 NOTE — TELEPHONE ENCOUNTER
Patient contacted the office about having labs do today.      He would like to have a psa added to all of his routine blood work.    Patient has an appointment scheduled for 6/16/2020

## 2020-06-11 NOTE — TELEPHONE ENCOUNTER
He is followed by Dr. Bobby who is closely monitoring an elevated PSA level. Dr. Bobby may have a particular PSA test he may want. I am happy to add to his labs but he needs to know exactly what is needed for Dr. Bobby.    Other lab orders sent in.

## 2020-06-12 DIAGNOSIS — R97.20 ELEVATED PROSTATE SPECIFIC ANTIGEN (PSA): Primary | ICD-10-CM

## 2020-06-18 ENCOUNTER — LAB (OUTPATIENT)
Dept: FAMILY MEDICINE CLINIC | Facility: CLINIC | Age: 55
End: 2020-06-18

## 2020-06-19 LAB
ALBUMIN SERPL-MCNC: 4.6 G/DL (ref 3.5–5.2)
ALBUMIN/GLOB SERPL: 1.8 G/DL
ALP SERPL-CCNC: 70 U/L (ref 39–117)
ALT SERPL-CCNC: 20 U/L (ref 1–41)
AST SERPL-CCNC: 18 U/L (ref 1–40)
BILIRUB SERPL-MCNC: 0.8 MG/DL (ref 0.2–1.2)
BUN SERPL-MCNC: 9 MG/DL (ref 6–20)
BUN/CREAT SERPL: 9.7 (ref 7–25)
CALCIUM SERPL-MCNC: 9.6 MG/DL (ref 8.6–10.5)
CHLORIDE SERPL-SCNC: 97 MMOL/L (ref 98–107)
CHOLEST SERPL-MCNC: 169 MG/DL (ref 0–200)
CO2 SERPL-SCNC: 27.2 MMOL/L (ref 22–29)
CREAT SERPL-MCNC: 0.93 MG/DL (ref 0.76–1.27)
ERYTHROCYTE [DISTWIDTH] IN BLOOD BY AUTOMATED COUNT: 14 % (ref 12.3–15.4)
GLOBULIN SER CALC-MCNC: 2.5 GM/DL
GLUCOSE SERPL-MCNC: 88 MG/DL (ref 65–99)
HBA1C MFR BLD: 5.8 % (ref 4.8–5.6)
HCT VFR BLD AUTO: 48.6 % (ref 37.5–51)
HDLC SERPL-MCNC: 49 MG/DL (ref 40–60)
HGB BLD-MCNC: 16.2 G/DL (ref 13–17.7)
LDLC SERPL CALC-MCNC: 97 MG/DL (ref 0–100)
MCH RBC QN AUTO: 28.9 PG (ref 26.6–33)
MCHC RBC AUTO-ENTMCNC: 33.3 G/DL (ref 31.5–35.7)
MCV RBC AUTO: 86.6 FL (ref 79–97)
PLATELET # BLD AUTO: 243 10*3/MM3 (ref 140–450)
POTASSIUM SERPL-SCNC: 4.3 MMOL/L (ref 3.5–5.2)
PROT SERPL-MCNC: 7.1 G/DL (ref 6–8.5)
RBC # BLD AUTO: 5.61 10*6/MM3 (ref 4.14–5.8)
SODIUM SERPL-SCNC: 137 MMOL/L (ref 136–145)
TRIGL SERPL-MCNC: 113 MG/DL (ref 0–150)
VLDLC SERPL CALC-MCNC: 22.6 MG/DL
WBC # BLD AUTO: 8.27 10*3/MM3 (ref 3.4–10.8)

## 2020-06-22 ENCOUNTER — OFFICE VISIT (OUTPATIENT)
Dept: FAMILY MEDICINE CLINIC | Facility: CLINIC | Age: 55
End: 2020-06-22

## 2020-06-22 VITALS
TEMPERATURE: 97.8 F | BODY MASS INDEX: 27.4 KG/M2 | WEIGHT: 185 LBS | SYSTOLIC BLOOD PRESSURE: 132 MMHG | HEART RATE: 81 BPM | HEIGHT: 69 IN | OXYGEN SATURATION: 97 % | RESPIRATION RATE: 12 BRPM | DIASTOLIC BLOOD PRESSURE: 80 MMHG

## 2020-06-22 DIAGNOSIS — R00.2 PALPITATIONS: ICD-10-CM

## 2020-06-22 DIAGNOSIS — F41.9 ANXIETY: ICD-10-CM

## 2020-06-22 DIAGNOSIS — R73.03 PREDIABETES: ICD-10-CM

## 2020-06-22 DIAGNOSIS — I10 ESSENTIAL HYPERTENSION: Primary | ICD-10-CM

## 2020-06-22 DIAGNOSIS — R97.20 ELEVATED PSA, LESS THAN 10 NG/ML: ICD-10-CM

## 2020-06-22 PROBLEM — E78.2 MIXED HYPERLIPIDEMIA: Status: RESOLVED | Noted: 2019-12-17 | Resolved: 2020-06-22

## 2020-06-22 PROCEDURE — 99214 OFFICE O/P EST MOD 30 MIN: CPT | Performed by: FAMILY MEDICINE

## 2020-06-22 PROCEDURE — 93000 ELECTROCARDIOGRAM COMPLETE: CPT | Performed by: FAMILY MEDICINE

## 2020-06-22 RX ORDER — CITALOPRAM 20 MG/1
20 TABLET ORAL DAILY
Qty: 30 TABLET | Refills: 5 | Status: SHIPPED | OUTPATIENT
Start: 2020-06-22 | End: 2021-07-19

## 2020-06-22 RX ORDER — VALSARTAN AND HYDROCHLOROTHIAZIDE 160; 25 MG/1; MG/1
1 TABLET ORAL DAILY
Qty: 30 TABLET | Refills: 5 | Status: SHIPPED | OUTPATIENT
Start: 2020-06-22 | End: 2020-11-23 | Stop reason: SDUPTHER

## 2020-06-22 NOTE — PROGRESS NOTES
"Subjective   Prince Wesley is a 54 y.o. male.     History of Present Illness   Mr. wesley presents today for routine follow-up on several chronic medical problems.    He has hypertension currently on Diovan HCT.  Taking as prescribed.  Denies side effects.  Blood pressure has been controlled.  No chest pain, increased swelling in extremities, no dyspnea on exertion.    Also noted to previously have obesity, prediabetes.  Has been working hard to increase his activity, modify diet.  He is eating more whole foods, avoiding junk foods, sodas.  He had gotten up to a peak weight of 215 pounds.  Today is 185.  Overall he states he is feeling much better.    He has chronic anxiety disorder currently treated with Celexa.  He is unsure if it is \"doing anything\".  His anxiety seems to begin in the morning when he first awakens.  He describes having \"funny feeling in chest\" with pressure and palpitations.  Then he feels like they triggered anxiety states with him the remainder of the day.  Denies any particular situational anxiety.  Denies depression.  He has had work-up for palpitations in the distant past.  Reports Holter monitor at that time was negative other than \"occasional extra beat\".    He has had consultation with Dr. Bobby for elevated PSA and strong family history of prostate cancer.  No follow-up currently scheduled.  Has not had follow-up PSA.    The following portions of the patient's history were reviewed and updated as appropriate: allergies, current medications, past family history, past medical history, past social history, past surgical history and problem list.    Review of Systems   Constitutional: Negative for appetite change, fatigue, fever and unexpected weight change.   HENT: Negative for congestion, ear pain, hearing loss, nosebleeds, rhinorrhea, sneezing, sore throat, tinnitus and trouble swallowing.    Eyes: Negative for pain, discharge and redness.   Respiratory: Negative for cough, chest tightness, " shortness of breath and wheezing.    Cardiovascular: Positive for palpitations. Negative for chest pain and leg swelling.   Gastrointestinal: Negative for abdominal pain, blood in stool, constipation, diarrhea, nausea and vomiting.   Endocrine: Negative for cold intolerance, heat intolerance, polydipsia and polyuria.   Genitourinary: Negative for decreased urine volume, difficulty urinating, dysuria, flank pain, frequency, hematuria and urgency.   Musculoskeletal: Positive for arthralgias. Negative for back pain, joint swelling, myalgias and neck pain.   Skin: Negative for rash and wound.   Neurological: Negative for dizziness, tremors, seizures, syncope, speech difficulty, weakness, numbness and headaches.   Hematological: Negative for adenopathy. Does not bruise/bleed easily.   Psychiatric/Behavioral: Negative for confusion, dysphoric mood and sleep disturbance. The patient is nervous/anxious.    Pt's previous ROS reviewed and updated as indicated.       Objective    Vitals:    06/22/20 1429   BP: 132/80   Pulse: 81   Resp: 12   Temp: 97.8 °F (36.6 °C)   SpO2: 97%     Body mass index is 27.32 kg/m².      06/22/20  1429   Weight: 83.9 kg (185 lb)       Physical Exam   Constitutional: He is oriented to person, place, and time. He appears well-developed and well-nourished. He is cooperative. He does not appear ill. No distress.   HENT:   Head: Normocephalic and atraumatic.   Mallampati Class 3   Eyes: Conjunctivae, EOM and lids are normal. No scleral icterus. Right eye exhibits no nystagmus. Left eye exhibits no nystagmus.   Neck: Phonation normal. Neck supple. Normal carotid pulses present. Carotid bruit is not present. No thyromegaly present.   Cardiovascular: Normal rate, regular rhythm, S1 normal, S2 normal and intact distal pulses.  Occasional extrasystoles are present. Exam reveals no gallop.   No murmur heard.  Pulmonary/Chest: Effort normal and breath sounds normal.   Musculoskeletal: He exhibits no edema,  tenderness or deformity.     Vascular Status -  His right foot exhibits no edema. His left foot exhibits no edema.  Lymphadenopathy:     He has no cervical adenopathy.   Neurological: He is alert and oriented to person, place, and time. He displays no tremor. Gait normal.   Skin: Skin is warm and dry. No ecchymosis and no rash noted. No cyanosis. Nails show no clubbing.   Psychiatric: He has a normal mood and affect. His speech is normal and behavior is normal. Judgment and thought content normal. Cognition and memory are normal.   Good eye contact. Answers questions appropriately. Good personal hygiene and grooming.   Nursing note and vitals reviewed.    Pt's previous physical exam reviewed and updated as indicated.        ECG 12 Lead  Date/Time: 6/22/2020 3:37 PM  Performed by: Amy Thomas MD  Authorized by: Amy Thomas MD   Comparison: not compared with previous ECG   Previous ECG: no previous ECG available  Rhythm: sinus rhythm  Ectopy comments: none  Rate: normal  BPM: 64  Conduction: conduction normal  Q wave noted on lead: none.    ST Segments: ST segments normal  T Waves: T waves normal  QRS axis: normal  Other: no other findings    Clinical impression: normal ECG  Comments: MN int: 160 ms  QRS dur: 93 ms  QTc: 388 ms            Lab Results   Component Value Date    WBC 8.27 06/18/2020    HGB 16.2 06/18/2020    HCT 48.6 06/18/2020    MCV 86.6 06/18/2020     06/18/2020       Lab Results   Component Value Date    BUN 9 06/18/2020    CREATININE 0.93 06/18/2020    EGFRIFNONA 85 06/18/2020    EGFRIFAFRI 103 06/18/2020    BCR 9.7 06/18/2020    K 4.3 06/18/2020    CO2 27.2 06/18/2020    CALCIUM 9.6 06/18/2020    PROTENTOTREF 7.1 06/18/2020    ALBUMIN 4.60 06/18/2020    LABIL2 1.8 06/18/2020    AST 18 06/18/2020    ALT 20 06/18/2020       Lab Results   Component Value Date    CHLPL 169 06/18/2020    TRIG 113 06/18/2020    HDL 49 06/18/2020    LDL 97 06/18/2020       Lab Results   Component Value  "Date    HGBA1C 5.80 (H) 06/18/2020       Lab Results   Component Value Date    TSH 2.620 12/03/2019       Assessment/Plan   Prince was seen today for hypertension.    Diagnoses and all orders for this visit:    Essential hypertension  -     valsartan-hydrochlorothiazide (Diovan HCT) 160-25 MG per tablet; Take 1 tablet by mouth Daily.    Prediabetes    Elevated PSA, less than 10 ng/ml    Anxiety    Palpitations  -     ECG 12 Lead    Other orders  -     citalopram (CeleXA) 20 MG tablet; Take 1 tablet by mouth Daily.       Essential hypertension controlled on Diovan HCT.  He is interested in potentially discontinuing this medication.  Strongly advised to continue taking as prescribed for now, check BP morning and evening at least 3 days/week and record for my review.    Prediabetes improved with successful weight loss and lifestyle modification.  Is encouraged to continue his healthy eating and regular exercise.  His lipid profile has also significantly improved.    Generalized anxiety previously treated with Celexa.  He wishes to discontinue medication and see if he \"does just as well\".  To that end weaning protocol reviewed.  I feel some of his morning anxiety may be triggered by premature beats.  These were heard intermittently on exam, EKG normal.  If the symptoms persist consider re-eval by cardiology.  He is otherwise asymptomatic and able to perform high intensity exercise without difficulty.    He is encouraged to follow-up with Dr. Bobby regarding elevated PSA, family history of prostate cancer.  He is encouraged to have follow-up PSA as previously ordered per Dr. Bobby.    Routine follow-up in 6 months, sooner as needed.  Patient was encouraged to keep me informed of any acute changes, lack of improvement, or any new concerning symptoms.  Pt is aware of reasons to seek emergent care.  Patient voiced understanding of all instructions and denied further questions.              Please note that portions of this " note may have been completed with a voice recognition program. Efforts were made to edit the dictations, but occasionally words are mistranscribed.

## 2020-06-22 NOTE — PATIENT INSTRUCTIONS
WEAN OFF CELEXA: TAKE 1/2 TABLET X 10 DAYS THEN STOP    CHECK BP MORNING AND EVENING AT LEAST 3 DAYS PER WEEK, RECORDS- REPORT BACK IN 2 WEEKS REGARDING READINGS

## 2020-08-17 ENCOUNTER — LAB (OUTPATIENT)
Dept: FAMILY MEDICINE CLINIC | Facility: CLINIC | Age: 55
End: 2020-08-17

## 2020-08-18 LAB — PSA SERPL-MCNC: 15.1 NG/ML (ref 0–4)

## 2020-09-03 ENCOUNTER — OFFICE VISIT (OUTPATIENT)
Dept: UROLOGY | Facility: CLINIC | Age: 55
End: 2020-09-03

## 2020-09-03 VITALS
HEIGHT: 69 IN | BODY MASS INDEX: 27.4 KG/M2 | DIASTOLIC BLOOD PRESSURE: 90 MMHG | WEIGHT: 185 LBS | SYSTOLIC BLOOD PRESSURE: 150 MMHG | OXYGEN SATURATION: 98 % | HEART RATE: 88 BPM

## 2020-09-03 DIAGNOSIS — R97.20 ELEVATED PROSTATE SPECIFIC ANTIGEN (PSA): Primary | ICD-10-CM

## 2020-09-03 LAB
BILIRUB BLD-MCNC: ABNORMAL MG/DL
CLARITY, POC: CLEAR
COLOR UR: YELLOW
GLUCOSE UR STRIP-MCNC: NEGATIVE MG/DL
KETONES UR QL: ABNORMAL
LEUKOCYTE EST, POC: NEGATIVE
NITRITE UR-MCNC: NEGATIVE MG/ML
PH UR: 6 [PH] (ref 5–8)
PROT UR STRIP-MCNC: ABNORMAL MG/DL
RBC # UR STRIP: NEGATIVE /UL
SP GR UR: 1.03 (ref 1–1.03)
UROBILINOGEN UR QL: ABNORMAL

## 2020-09-03 PROCEDURE — 81003 URINALYSIS AUTO W/O SCOPE: CPT | Performed by: UROLOGY

## 2020-09-03 PROCEDURE — 99214 OFFICE O/P EST MOD 30 MIN: CPT | Performed by: UROLOGY

## 2020-09-03 RX ORDER — CEPHALEXIN 500 MG/1
500 CAPSULE ORAL 2 TIMES DAILY
Qty: 6 CAPSULE | Refills: 0 | Status: SHIPPED | OUTPATIENT
Start: 2020-09-03 | End: 2020-09-06

## 2020-09-03 RX ORDER — CIPROFLOXACIN 500 MG/1
500 TABLET, FILM COATED ORAL 2 TIMES DAILY
Qty: 6 TABLET | Refills: 0 | Status: SHIPPED | OUTPATIENT
Start: 2020-09-03 | End: 2020-11-12

## 2020-09-03 RX ORDER — MAGNESIUM HYDROXIDE 1200 MG/15ML
1 LIQUID ORAL ONCE
Qty: 1 ENEMA | Refills: 0 | Status: SHIPPED | OUTPATIENT
Start: 2020-09-03 | End: 2020-09-03

## 2020-09-03 NOTE — PROGRESS NOTES
Chief Complaint  Elevated PSA    HPI  Mr. Wesley is a 55 y.o.  male who presented with an elevated PSA to 7.2.    He is status post 4K score, which demonstrated a 40% chance of finding clinically significant prostate cancer (i.e. Edyta 7 or greater prostate cancer).  His PSA along with this test returned at 9.8. His PSA is now up to 15. He was lost to FU this spring.  He reports that his insurance company denied coverage of the prostate biopsy.  He has no new symptoms of bone pain or weight loss.    For Review,  He does have a family history of prostate cancer.  He saw Dr. Moffett 10 + years ago. Never had biopsy.  Patient complains of nocturia x1, gross hematuria. He thinks he had a kidney stone when PSA was taken. He denies infection, but had dysuria and urgency at that time.  Patient denies any urinary Sx today.  Patient denies fevers, chills, nausea, vomiting, constipation, or flank pain.  Past  history is negative for BPH, frequent UTIs, or other renal diseases.   He denies shortness of breath, leg swelling, calf pain or bone pain.  He is .  The penile lesions developed after his divorce, but no new sexual partners.  He denies any urethral discharge or burning today.      IPSS Questionnaire (AUA-7):  Over the past month…    1)  Incomplete Emptying  How often have you had a sensation of not emptying your bladder?  0 - Not at all   2)  Frequency  How often have you had to urinate less than every two hours? 1 - Less than 1 time in 5   3)  Intermittency  How often have you found you stopped and started again several times when you urinated?  0 - Not at all   4) Urgency  How often have you found it difficult to postpone urination?  1 - Less than 1 time in 5   5) Weak Stream  How often have you had a weak urinary stream?  1 - Less than 1 time in 5   6) Straining  How often have you had to push or strain to begin urination?  0 - Not at all   7) Nocturia  How many times did you typically get up at  night to urinate?  1 - 1 time   Total Score:  4       Quality of life due to urinary symptoms:  If you were to spend the rest of your life with your urinary condition the way it is now, how would you feel about that? 2-Mostly Satisfied   Urine Leakage (Incontinence) 0-No Leakage     Past Medical History  Past Medical History:   Diagnosis Date   • GERD (gastroesophageal reflux disease)    • Headache    • Hypertension    • Mixed hyperlipidemia 12/17/2019       Past Surgical History  Past Surgical History:   Procedure Laterality Date   • SQUAMOUS CELL CARCINOMA EXCISION N/A 3-4 years ago   • WISDOM TOOTH EXTRACTION         Medications    Current Outpatient Medications:   •  citalopram (CeleXA) 20 MG tablet, Take 1 tablet by mouth Daily., Disp: 30 tablet, Rfl: 5  •  valsartan-hydrochlorothiazide (Diovan HCT) 160-25 MG per tablet, Take 1 tablet by mouth Daily., Disp: 30 tablet, Rfl: 5    Allergies  No Known Allergies    Social History  Social History     Tobacco Use   • Smoking status: Never Smoker   • Smokeless tobacco: Never Used   Substance Use Topics   • Alcohol use: Yes     Frequency: 4 or more times a week     Drinks per session: 1 or 2   • Drug use: No       Family History  Family History   Problem Relation Age of Onset   • Skin cancer Father    • Prostate cancer Father    • Prostate cancer Paternal Grandfather         Review of Systems  Constitutional: No fevers or chills  Skin: Negative for rash  Endocrine: No heat/cold intolerance   Cardiovascular: Negative for chest pain or dyspnea on exertion  Respiratory: Negative for shortness of breath or wheezing  Gastrointestinal: No constipation, nausea or vomiting  Genitourinary: Negative for new lower urinary tract symptoms, current gross hematuria or dysuria.  Musculoskeletal: No flank pain  Neurological:  Negative for frequent headaches or dizziness  Lymph/Heme: Negative for leg swelling or calf pain.    Physical Exam  Visit Vitals  /90   Pulse 88   Ht 175.3  "cm (69\")   Wt 83.9 kg (185 lb)   SpO2 98%   BMI 27.32 kg/m²     Constitutional: NAD, WDWN.   HEENT: NCAT. Conjunctivae normal.  MMM.    Cardiovascular: Regular rate.  Pulmonary/Chest: Respirations are even and non-labored bilaterally.  Abdominal: Soft. No distension, tenderness, masses or guarding. No CVA tenderness.  Neurological: A + O x 3.  Cranial Nerves II-XII grossly intact. Normal gait.  Extremities: ANTHONY x 4, Warm. No clubbing.  No cyanosis.    Skin: Pink, warm and dry.  No rashes noted.      Labs  Brief Urine Lab Results  (Last result in the past 365 days)      Color   Clarity   Blood   Leuk Est   Nitrite   Protein   CREAT   Urine HCG        09/03/20 1617 Yellow Clear Negative Negative Negative Trace               Lab Results   Component Value Date    PSA 15.100 (H) 08/17/2020    PSA 7.220 (H) 12/03/2019       Assessment  Mr. Wesley is a 55 y.o.  male who presents with elevated PSA; he had gross hematuria and urgency at that time when the PSA was drawn.  His 4K score demonstrates a significant risk of prostate cancer diagnosis on biopsy.  He also has condyloma of the penile shaft skin.    I explained to the patient that an elevated PSA is a marker of risk of prostate cancer and a prostate biopsy would be the next step in diagnosis. I explained that sampling error can occur with any biopsy and there is a risk of potentially missing a cancer that may be present.      I discussed the risks, benefits, and alternatives to prostate biopsy, including hematuria, hematochezia, and hematospermia.  I also discussed the risk of diagnosing a clinically-insignificant prostate cancer.  I discussed the risks of sepsis, which can be minimized by prophylactic antibiotics.     He was lost to follow-up during COVID.  His PSA has not doubled.  Insurance company previously denied prostate biopsy reportedly.    Plan  1.  Schedule for cystoscopy and prostate biopsy, antibiotics sent in  2.  Offered him an office excisional " biopsy of the condyloma in the future after we have performed the rest of his work-up.    No follow-ups on file.    Ryley Bobby MD

## 2020-11-03 DIAGNOSIS — R97.20 ELEVATED PROSTATE SPECIFIC ANTIGEN (PSA): ICD-10-CM

## 2020-11-03 DIAGNOSIS — R97.20 ELEVATED PSA: Primary | ICD-10-CM

## 2020-11-03 RX ORDER — CEPHALEXIN 500 MG/1
500 CAPSULE ORAL 2 TIMES DAILY
Qty: 6 CAPSULE | Refills: 0 | Status: SHIPPED | OUTPATIENT
Start: 2020-11-03 | End: 2020-11-06

## 2020-11-03 RX ORDER — CIPROFLOXACIN 500 MG/1
500 TABLET, FILM COATED ORAL 2 TIMES DAILY
Qty: 6 TABLET | Refills: 0 | Status: SHIPPED | OUTPATIENT
Start: 2020-11-03 | End: 2020-11-12

## 2020-11-03 RX ORDER — SODIUM PHOSPHATE, DIBASIC AND SODIUM PHOSPHATE, MONOBASIC 7; 19 G/133ML; G/133ML
ENEMA RECTAL ONCE
Status: CANCELLED | OUTPATIENT
Start: 2020-11-03 | End: 2020-11-03

## 2020-11-03 RX ORDER — MAGNESIUM HYDROXIDE 1200 MG/15ML
1 LIQUID ORAL ONCE
Qty: 1 ENEMA | Refills: 0 | Status: SHIPPED | OUTPATIENT
Start: 2020-11-03 | End: 2020-11-03

## 2020-11-03 RX ORDER — CIPROFLOXACIN 500 MG/1
500 TABLET, FILM COATED ORAL 2 TIMES DAILY
Qty: 6 TABLET | Refills: 0 | Status: CANCELLED | OUTPATIENT
Start: 2020-11-03

## 2020-11-03 RX ORDER — SODIUM PHOSPHATE, DIBASIC AND SODIUM PHOSPHATE, MONOBASIC 7; 19 G/133ML; G/133ML
ENEMA RECTAL ONCE
COMMUNITY
End: 2020-11-12

## 2020-11-06 ENCOUNTER — PROCEDURE VISIT (OUTPATIENT)
Dept: UROLOGY | Facility: CLINIC | Age: 55
End: 2020-11-06

## 2020-11-06 DIAGNOSIS — R31.0 GROSS HEMATURIA: ICD-10-CM

## 2020-11-06 DIAGNOSIS — R97.20 ELEVATED PSA: Primary | ICD-10-CM

## 2020-11-06 PROCEDURE — 52000 CYSTOURETHROSCOPY: CPT | Performed by: UROLOGY

## 2020-11-06 PROCEDURE — 55700 PR PROSTATE NEEDLE BIOPSY ANY APPROACH: CPT | Performed by: UROLOGY

## 2020-11-06 PROCEDURE — 76942 ECHO GUIDE FOR BIOPSY: CPT | Performed by: UROLOGY
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
## 2020-11-12 ENCOUNTER — OFFICE VISIT (OUTPATIENT)
Dept: UROLOGY | Facility: CLINIC | Age: 55
End: 2020-11-12

## 2020-11-12 VITALS
HEART RATE: 97 BPM | WEIGHT: 195 LBS | TEMPERATURE: 97.3 F | BODY MASS INDEX: 28.88 KG/M2 | OXYGEN SATURATION: 98 % | HEIGHT: 69 IN

## 2020-11-12 DIAGNOSIS — C61 PROSTATE CANCER (HCC): Primary | ICD-10-CM

## 2020-11-12 PROCEDURE — 99214 OFFICE O/P EST MOD 30 MIN: CPT | Performed by: UROLOGY

## 2020-11-12 RX ORDER — MAGNESIUM HYDROXIDE 1200 MG/15ML
1 LIQUID ORAL ONCE
Qty: 1 ENEMA | Refills: 0 | Status: SHIPPED | OUTPATIENT
Start: 2020-11-12 | End: 2020-11-12

## 2020-11-12 RX ORDER — CIPROFLOXACIN 500 MG/1
500 TABLET, FILM COATED ORAL 2 TIMES DAILY
Qty: 6 TABLET | Refills: 0 | Status: SHIPPED | OUTPATIENT
Start: 2020-11-12 | End: 2020-12-03

## 2020-11-12 RX ORDER — CEPHALEXIN 500 MG/1
500 CAPSULE ORAL 2 TIMES DAILY
Qty: 6 CAPSULE | Refills: 0 | Status: SHIPPED | OUTPATIENT
Start: 2020-11-12 | End: 2020-11-15

## 2020-11-12 NOTE — PROGRESS NOTES
Chief Complaint  Elevated PSA    HPI  Mr. Wesley is a 55 y.o.  male who presented with an elevated PSA to 7.2.    He is status post prostate biopsy.  He did not have any fevers or hematuria.  He presents today to review pathology.  The results are scanned in.  Biopsy showed multiple cores positive with Edyta 3+4 equal 7 disease.    For Review,  He does have a family history of prostate cancer.  He saw Dr. Moffett 10 + years ago. Never had biopsy.  Patient complains of nocturia x1, gross hematuria. He thinks he had a kidney stone when PSA was taken. He denies infection, but had dysuria and urgency at that time.  Patient denies any urinary Sx today.  Patient denies fevers, chills, nausea, vomiting, constipation, or flank pain.  Past  history is negative for BPH, frequent UTIs, or other renal diseases.   He denies shortness of breath, leg swelling, calf pain or bone pain.  He is .  The penile lesions developed after his divorce, but no new sexual partners.  He denies any urethral discharge or burning today.      IPSS Questionnaire (AUA-7):  Over the past month…    1)  Incomplete Emptying  How often have you had a sensation of not emptying your bladder?  0 - Not at all   2)  Frequency  How often have you had to urinate less than every two hours? 1 - Less than 1 time in 5   3)  Intermittency  How often have you found you stopped and started again several times when you urinated?  0 - Not at all   4) Urgency  How often have you found it difficult to postpone urination?  1 - Less than 1 time in 5   5) Weak Stream  How often have you had a weak urinary stream?  1 - Less than 1 time in 5   6) Straining  How often have you had to push or strain to begin urination?  0 - Not at all   7) Nocturia  How many times did you typically get up at night to urinate?  1 - 1 time   Total Score:  4       Quality of life due to urinary symptoms:  If you were to spend the rest of your life with your urinary condition the way  "it is now, how would you feel about that? 2-Mostly Satisfied   Urine Leakage (Incontinence) 0-No Leakage     Past Medical History  Past Medical History:   Diagnosis Date   • GERD (gastroesophageal reflux disease)    • Headache    • Hypertension    • Mixed hyperlipidemia 12/17/2019       Past Surgical History  Past Surgical History:   Procedure Laterality Date   • SQUAMOUS CELL CARCINOMA EXCISION N/A 3-4 years ago   • WISDOM TOOTH EXTRACTION         Medications    Current Outpatient Medications:   •  citalopram (CeleXA) 20 MG tablet, Take 1 tablet by mouth Daily., Disp: 30 tablet, Rfl: 5  •  valsartan-hydrochlorothiazide (Diovan HCT) 160-25 MG per tablet, Take 1 tablet by mouth Daily., Disp: 30 tablet, Rfl: 5    Allergies  No Known Allergies    Social History  Social History     Tobacco Use   • Smoking status: Never Smoker   • Smokeless tobacco: Never Used   Substance Use Topics   • Alcohol use: Yes     Frequency: 4 or more times a week     Drinks per session: 1 or 2   • Drug use: No       Family History  Family History   Problem Relation Age of Onset   • Skin cancer Father    • Prostate cancer Father    • Prostate cancer Paternal Grandfather         Review of Systems  Constitutional: No fevers or chills  Skin: Negative for rash  Endocrine: No heat/cold intolerance   Cardiovascular: Negative for chest pain or dyspnea on exertion  Respiratory: Negative for shortness of breath or wheezing  Gastrointestinal: No constipation, nausea or vomiting  Genitourinary: Negative for new lower urinary tract symptoms, current gross hematuria or dysuria.  Musculoskeletal: No flank pain  Neurological:  Negative for frequent headaches or dizziness  Lymph/Heme: Negative for leg swelling or calf pain.    Physical Exam  Visit Vitals  Pulse 97   Temp 97.3 °F (36.3 °C)   Ht 175.3 cm (69\")   Wt 88.5 kg (195 lb)   SpO2 98%   BMI 28.80 kg/m²     Constitutional: NAD, WDWN.   HEENT: NCAT. Conjunctivae normal.  MMM.    Cardiovascular: Regular " rate.  Pulmonary/Chest: Respirations are even and non-labored bilaterally.  Abdominal: Soft. No distension, tenderness, masses or guarding. No CVA tenderness.  Neurological: A + O x 3.  Cranial Nerves II-XII grossly intact. Normal gait.  Extremities: ANTHONY x 4, Warm. No clubbing.  No cyanosis.    Skin: Pink, warm and dry.  No rashes noted.      Labs  Brief Urine Lab Results  (Last result in the past 365 days)      Color   Clarity   Blood   Leuk Est   Nitrite   Protein   CREAT   Urine HCG        09/03/20 1617 Yellow Clear Negative Negative Negative Trace               Lab Results   Component Value Date    PSA 15.100 (H) 08/17/2020    PSA 7.220 (H) 12/03/2019       Assessment  Mr. Wesley is a 55 y.o.  male who presents with elevated PSA; he had gross hematuria and urgency at that time when the PSA was drawn.  His 4K score demonstrates a significant risk of prostate cancer diagnosis on biopsy. He also has condyloma of the penile shaft skin.     Prostate biopsy demonstrates multiple cores positive for intermediate risk prostate cancer.  We discussed his different options and I did recommend treatment.  We discussed different treatment options including radiation and surgery.  He is very set against surgery.  His father had radiation treatment and did very well with it.     Plan  1.  Referral to Dr. Piedra   2.  Fiducial markers in 4 weeks.  3.  CT abdomen pelvis to make sure he does not have any lymphadenopathy    No follow-ups on file.    Ryley Bobby MD

## 2020-11-20 ENCOUNTER — TELEPHONE (OUTPATIENT)
Dept: FAMILY MEDICINE CLINIC | Facility: CLINIC | Age: 55
End: 2020-11-20

## 2020-11-20 DIAGNOSIS — I10 ESSENTIAL HYPERTENSION: ICD-10-CM

## 2020-11-23 RX ORDER — VALSARTAN AND HYDROCHLOROTHIAZIDE 160; 25 MG/1; MG/1
1 TABLET ORAL DAILY
Qty: 30 TABLET | Refills: 5 | Status: SHIPPED | OUTPATIENT
Start: 2020-11-23 | End: 2021-09-01 | Stop reason: SDUPTHER

## 2020-11-30 ENCOUNTER — HOSPITAL ENCOUNTER (OUTPATIENT)
Dept: CT IMAGING | Facility: HOSPITAL | Age: 55
Discharge: HOME | End: 2020-11-30
Admitting: UROLOGY

## 2020-11-30 DIAGNOSIS — C61 PROSTATE CANCER (HCC): ICD-10-CM

## 2020-11-30 PROCEDURE — 74177 CT ABD & PELVIS W/CONTRAST: CPT

## 2020-11-30 PROCEDURE — 25010000002 IOPAMIDOL 61 % SOLUTION: Performed by: UROLOGY

## 2020-11-30 RX ADMIN — IOPAMIDOL 100 ML: 612 INJECTION, SOLUTION INTRAVENOUS at 07:40

## 2020-12-03 ENCOUNTER — HOSPITAL ENCOUNTER (OUTPATIENT)
Dept: RADIATION ONCOLOGY | Facility: HOSPITAL | Age: 55
Setting detail: RADIATION/ONCOLOGY SERIES
Discharge: HOME | End: 2020-12-03

## 2020-12-03 ENCOUNTER — OFFICE VISIT (OUTPATIENT)
Dept: RADIATION ONCOLOGY | Facility: HOSPITAL | Age: 55
End: 2020-12-03

## 2020-12-03 VITALS
RESPIRATION RATE: 18 BRPM | WEIGHT: 186 LBS | TEMPERATURE: 97.3 F | SYSTOLIC BLOOD PRESSURE: 146 MMHG | HEART RATE: 87 BPM | OXYGEN SATURATION: 97 % | DIASTOLIC BLOOD PRESSURE: 96 MMHG | BODY MASS INDEX: 27.47 KG/M2

## 2020-12-03 DIAGNOSIS — C61 PROSTATE CANCER (HCC): ICD-10-CM

## 2020-12-03 LAB — PSA SERPL-MCNC: 13.9 NG/ML (ref 0–4)

## 2020-12-03 PROCEDURE — G0463 HOSPITAL OUTPT CLINIC VISIT: HCPCS | Performed by: RADIOLOGY

## 2020-12-03 PROCEDURE — 84153 ASSAY OF PSA TOTAL: CPT | Performed by: RADIOLOGY

## 2020-12-03 NOTE — PROGRESS NOTES
CONSULTATION NOTE      :                                                          1965  DATE OF CONSULTATION:                       12/3/2020   REQUESTING PHYSICIAN:                   Ryley Bobby MD  REASON FOR CONSULTATION:           Prostate Cancer  Cancer Staging  Stage IIB (cT1c, cN0, cM0, PSA: 15.1, Grade Group: 2)    Thank you for requesting my services in evaluation of this pleasant individual.  I am seeing them in outpatient consultation regarding a diagnosis of prostate cancer.     BRIEF HISTORY:  The patient is a very pleasant 55 y.o. male  with past medical history significant for GERD and hypertension, who was identified last year to have a mildly elevated PSA which had reached a level of 7.2 NG/mL.  He was evaluated by Dr. Bobby who obtained a 4K score showing an elevated risk for high grade pathology of 40%.  He was in the process of being worked up for a prostate biopsy when the coronavirus pandemic happened as well as his insurance declined coverage for that prostate biopsy.  He therefore waited, and it was not until August of this year that he had a recheck of his PSA which showed a rather significant rise to a level of 15.1 NG/mL.  He underwent a transrectal ultrasound-guided biopsy last month which showed a [place] 3+4 = 7 adenocarcinoma involving up to 8 out of 15 cores and occupying as much as 37% of the individual specimens.  3 biopsy cores that showed a Edyta 3+3, but it was found on the bilateral sides of the prostate.  There is no evidence of perineural invasion.  He also underwent a CT scan which shows some mildly enlarged bilateral external iliac lymph nodes.  Dr. Bobby discussed multiple different treatment options with the patient and he was most interested in nonsurgical management, and it is for this reason that he presents to my clinic today.  From a symptomatic standpoint, he reports an IPSS score of 4, with symptoms only of urgency and weak stream less than 20% of  the time and frequency less than 50% of the time.  He reports slightly decreased erectile function, but denies any other gastrointestinal symptoms.  His last screening study was a Cologuard test  1 year ago and was reported as negative.    Allergy: No Known Allergies    Social History:   Social History     Socioeconomic History   • Marital status:      Spouse name: Not on file   • Number of children: Not on file   • Years of education: Not on file   • Highest education level: Not on file   Occupational History   • Occupation:    Tobacco Use   • Smoking status: Never Smoker   • Smokeless tobacco: Never Used   Substance and Sexual Activity   • Alcohol use: Yes     Frequency: 4 or more times a week     Drinks per session: 1 or 2   • Drug use: No   • Sexual activity: Not Currently     Partners: Female       Past Medical History:   Past Medical History:   Diagnosis Date   • GERD (gastroesophageal reflux disease)    • Headache    • Hypertension    • Mixed hyperlipidemia 12/17/2019   • Prostate cancer (CMS/HCC)    • Skin cancer        Family History: family history includes Prostate cancer in his father and paternal grandfather; Skin cancer in his father.     Surgical History:   Past Surgical History:   Procedure Laterality Date   • COLONOSCOPY      cologuard 2019   • PROSTATE BIOPSY     • SQUAMOUS CELL CARCINOMA EXCISION N/A 3-4 years ago   • WISDOM TOOTH EXTRACTION          Review of Systems:   Review of Systems   All other systems reviewed and are negative.           IPSS Questionnaire (AUA-7):  Over the past month…    1)  Incomplete Emptying  How often have you had a sensation of not emptying your bladder?  0 - Not at all   2)  Frequency  How often have you had to urinate less than every two hours? 2 - Less than half the time   3)  Intermittency  How often have you found you stopped and started again several times when you urinated?  0 - Not at all   4) Urgency  How often have you found it  difficult to postpone urination?  1 - Less than 1 time in 5   5) Weak Stream  How often have you had a weak urinary stream?  1 - Less than 1 time in 5   6) Straining  How often have you had to push or strain to begin urination?  0 - Not at all   7) Nocturia  How many times did you typically get up at night to urinate?  0 - None   Total Score:  4       Quality of life due to urinary symptoms:  If you were to spend the rest of your life with your urinary condition the way it is now, how would you feel about that? 1-Pleased   Urine Leakage (Incontinence) 0-No Leakage     Sexual Health Inventory  Current Status    1)  How do you rate your confidence that you could achieve and keep an erection? 3-Moderate   2) When you had erections with sexual stimulation, how often were your erections hard enough for penetration (entering your partner)? 5-Almost always or always   3)  During sexual intercourse, how often were you able to maintain your erection after you had penetrated (entered) into your partner? 5-Almost always or always   4) During sexual intercourse, how difficult was it to maintain your erection to completion of intercourse? 4-Slightly difficult   5) When you attempted sexual intercourse, how often was it satisfactory to you? 5-Almost always or always   Total Score: 22       Bowel Health Inventory  Current Status: 0-No problems, no rectal bleeding, no discharge, less then 5 bowel movements a day             Objective   VITAL SIGNS:   Vitals:    12/03/20 1053   BP: 146/96   Pulse: 87   Resp: 18   Temp: 97.3 °F (36.3 °C)   TempSrc: Temporal   SpO2: 97%   Weight: 84.4 kg (186 lb)   PainSc: 0-No pain        Karnofsky score: 80      Physical Exam:   Physical Exam  Vitals signs and nursing note reviewed.   Constitutional:       General: He is not in acute distress.     Appearance: He is well-developed.   HENT:      Head: Normocephalic and atraumatic.   Eyes:      Conjunctiva/sclera: Conjunctivae normal.      Pupils:  Pupils are equal, round, and reactive to light.   Neck:      Musculoskeletal: Normal range of motion and neck supple.   Cardiovascular:      Rate and Rhythm: Normal rate and regular rhythm.      Heart sounds: No murmur. No friction rub.   Pulmonary:      Effort: Pulmonary effort is normal.      Breath sounds: Normal breath sounds. No wheezing.   Abdominal:      General: Bowel sounds are normal. There is no distension.      Palpations: Abdomen is soft. There is no mass.      Tenderness: There is no abdominal tenderness.   Genitourinary:     Comments: 30 cc gland without focal nodules or masses.  Musculoskeletal: Normal range of motion.   Lymphadenopathy:      Cervical: No cervical adenopathy.   Skin:     General: Skin is warm and dry.   Neurological:      Mental Status: He is alert and oriented to person, place, and time.   Psychiatric:         Behavior: Behavior normal.         Thought Content: Thought content normal.         Judgment: Judgment normal.     IMAGING  I have personally reviewed the relevant imaging studies, as follows:  Ct Abdomen Pelvis With Contrast    Result Date: 11/30/2020  Mildly enlarged external iliac chain lymph nodes which are stable compared to the prior exam and are nonspecific. These can be followed up on subsequent exams to ensure stability.  1237.66 mGy.cm   This study was performed with techniques to keep radiation doses as low as reasonably achievable (ALARA). Individualized dose reduction techniques using automated exposure control or adjustment of mA and/or kV according to the patient size were employed.  This report was finalized on 11/30/2020 8:48 AM by Gene Mckeon M.D..    PATHOLOGY  TRUS Prostate biopsy 11/6/2020:      LABORATORY  PSA 8/17/20: 15.1 NG/mL  PSA 12/3/2019: 7.22 NG/mL       The following portions of the patient's history were reviewed and updated as appropriate: allergies, current medications, past family history, past medical history, past social history,  past surgical history and problem list.    Assessment:   Assessment  Mr. Wesley is a 55-year-old gentleman who presents for evaluation of a clinical T1c, Edyta 3+4 = 7 prostate adenocarcinoma with a pretreatment PSA of 15.1 NG/mL.  On initial evaluation, he appears to have localized disease of the prostate, however the findings on the CT scan are mildly concerning.  Does have to slightly enlarged external iliac lymph nodes, which would be one of the first year lymph nodes for involvement should he have any spread outside of the prostate, but when I look back and review his CT scans that were obtained in January of this year, the lymph nodes really do not appear different.  That being said, the rather abrupt rise in his PSA were doubled from December to August is certainly concerning and a high PSA like 15.1 NG/mL is certainly concerning and could have disease outside the prostate.  I discussed several different treatment options with Mr. Wesley today, focusing on the differences between prostate only directed treatment such as CyberKnife radiosurgery alone versus more comprehensive treatments including pelvic radiation therapy followed by a more conformal boost using CyberKnife, and how this latter treatment would actually provide additional coverage that would treat the at risk lymph nodes.  Given his positive family history that includes both his father and his grandfather, he is leaning more towards a combined course of pelvic radiation with CyberKnife for a boost.  This will consist of 46 Sanderson in 23 fractions to the pelvis followed by 21 Sanderson in 3 fractions using CyberKnife to the prostate alone.  Or things that I think can help answer this question for us, is I rechecked his PSA today, anticipate that if it continues to rise, but this is more evidence that we are seeing the lymph nodes may represent true disease and will necessitate a more comprehensive treatment approach.  If on the other hand his PSA reduces  back down to 7, then I assume the lymph nodes are uninvolved and we could proceed with a more prostate only treatment approach.  In addition, her stated that he remembers having a CT of the pelvis at [facility] several years ago - we can review that scan if available to compare the pelvic nodes, and I will also order bone scan to complete staging.  He is already scheduled to see Dr. Bobby again in 2 weeks to have markers placed.  I will review the pending studies we have ordered and will likely anticipate treating Mr. Wesley with a course of pelvic radiation followed by Cyberknife to the prostate.  After reviewing the risks and benefits, he signed informed consent today for radiation treatments.    Of note, Mr. Wesley has a no other significant medical comorbidities, and his UCONN healthy life expectancy is in excess of 20 years, so treatment is clearly warranted in his case.    RECOMMENDATIONS:    Repeat PSA  Bone Scan to complete staging  Anticipate pelvic radiatio to 46 Gy in 23 fractions followed by a CK boost of 21 Gy in 3 fractions    Follow Up:   Return in about 3 weeks (around 12/24/2020) for Radiation Simulation.  Diagnoses and all orders for this visit:    1. Prostate cancer (CMS/[facility])  -     PSA Diagnostic; Future  -     NM Bone Scan Whole Body; Future  -     PSA Diagnostic    ADDENDUM 12/4/2020:  Mr. Wesley's PSA returned at 13.1 ng/ml.  I am pleased that this has not continued to rise, but I do think that given this elevated PSA and the concern on his CT scan, it would be appropriate to provide pelvic IMRT to 46 Gy followed by a Cyberknife Boost as discussed above.  This will be a slightly more comprehensive treatment, and Mr. Wesley agreed.  We will see him in the upcoming weeks once he completes marker placement in order to complete a radiation setup and simulation session.    Thank you for allowing me to participate in the care of this individual.    Sincerely,       Jesse  Gera Piedra MD

## 2020-12-07 ENCOUNTER — TELEPHONE (OUTPATIENT)
Dept: RADIATION ONCOLOGY | Facility: HOSPITAL | Age: 55
End: 2020-12-07

## 2020-12-21 ENCOUNTER — PROCEDURE VISIT (OUTPATIENT)
Dept: UROLOGY | Facility: CLINIC | Age: 55
End: 2020-12-21

## 2020-12-21 VITALS — BODY MASS INDEX: 27.55 KG/M2 | HEIGHT: 69 IN | RESPIRATION RATE: 17 BRPM | WEIGHT: 186 LBS

## 2020-12-21 DIAGNOSIS — C61 PROSTATE CANCER (HCC): Primary | ICD-10-CM

## 2020-12-21 PROCEDURE — A4648 IMPLANTABLE TISSUE MARKER: HCPCS | Performed by: UROLOGY

## 2020-12-21 PROCEDURE — 76942 ECHO GUIDE FOR BIOPSY: CPT | Performed by: UROLOGY

## 2020-12-21 PROCEDURE — 55876 PLACE RT DEVICE/MARKER PROS: CPT | Performed by: UROLOGY

## 2020-12-21 NOTE — PROGRESS NOTES
TRUS OF PROSTATE WITH GOLD FIDUCIAL MARKER PLACEMENT    Preoperative diagnosis  Prostate cancer    Postoperative diagnosis  Same    Procedure  1.  Transrectal ultrasound of the prostate  2.  Transrectal gold fiducial marker placement x 5    Attending Surgeon  Ryley Bobby M.D.    Anesthesia  2% lidocaine jelly, intrarectal instillation, 10mL  10 mL lidocaine injection 1%    Complications  None    Specimen  None    Indications  55 y.o. Male with intermediate risk prostate cancer    He took his antibiotics starting the day prior to the procedure.    Procedure  The patient was positioned and prepped in a left lateral position with lower extremities flexed.  Lidocaine jelly, 2%, was injected per rectum. The [employer] [id number] rectal ultrasound probe was slowly introduced into the rectum without difficulty.  The prostate and seminal vesicles were inspected systematically using cross and sagittal views with the ultrasound.  Lidocaine was injected to the neurovascular bundles and along the rectal wall. There were no large hypoechoic areas within the prostate.  Using an introducer kit, 5 gold fiducial markers were placed, with 2 placed laterally at the base, 2 placed more medially in the mid gland, and 1 placed at the apex, taking care to try and achieve appropriate spacing.  The rectal ultrasound probe was removed.  The patient tolerated the procedure well.    Plan  1.  The patient was instructed to continue taking his antibiotics, which she had started yesterday, for a total of 3 days.  He was warned about the 2% possibility of infection after transrectal procedure.  2.  Follow-up with me with a PSA 6 months after his combined IMRT and SBRT with CT scan to continue monitoring the borderline lymph nodes.

## 2020-12-23 ENCOUNTER — APPOINTMENT (OUTPATIENT)
Dept: NUCLEAR MEDICINE | Facility: HOSPITAL | Age: 55
End: 2020-12-23

## 2020-12-24 ENCOUNTER — HOSPITAL ENCOUNTER (OUTPATIENT)
Dept: NUCLEAR MEDICINE | Facility: HOSPITAL | Age: 55
Discharge: HOME | End: 2020-12-24

## 2020-12-24 DIAGNOSIS — C61 PROSTATE CANCER (HCC): ICD-10-CM

## 2020-12-24 PROCEDURE — 78306 BONE IMAGING WHOLE BODY: CPT

## 2020-12-24 PROCEDURE — 0 TECHNETIUM MEDRONATE KIT: Performed by: RADIOLOGY

## 2020-12-24 PROCEDURE — A9503 TC99M MEDRONATE: HCPCS | Performed by: RADIOLOGY

## 2020-12-24 RX ORDER — TC 99M MEDRONATE 20 MG/10ML
26.5 INJECTION, POWDER, LYOPHILIZED, FOR SOLUTION INTRAVENOUS
Status: COMPLETED | OUTPATIENT
Start: 2020-12-24 | End: 2020-12-24

## 2020-12-24 RX ADMIN — TC 99M MEDRONATE 26.5 MILLICURIE: 20 INJECTION, POWDER, LYOPHILIZED, FOR SOLUTION INTRAVENOUS at 07:15

## 2020-12-30 ENCOUNTER — OFFICE VISIT (OUTPATIENT)
Dept: RADIATION ONCOLOGY | Facility: HOSPITAL | Age: 55
End: 2020-12-30

## 2020-12-30 ENCOUNTER — HOSPITAL ENCOUNTER (OUTPATIENT)
Dept: RADIATION ONCOLOGY | Facility: HOSPITAL | Age: 55
Discharge: HOME | End: 2020-12-30

## 2020-12-30 VITALS
TEMPERATURE: 97 F | WEIGHT: 186.8 LBS | OXYGEN SATURATION: 98 % | HEART RATE: 77 BPM | BODY MASS INDEX: 27.57 KG/M2 | RESPIRATION RATE: 18 BRPM | DIASTOLIC BLOOD PRESSURE: 97 MMHG | SYSTOLIC BLOOD PRESSURE: 152 MMHG

## 2020-12-30 DIAGNOSIS — C61 PROSTATE CANCER (HCC): ICD-10-CM

## 2020-12-30 PROCEDURE — G0463 HOSPITAL OUTPT CLINIC VISIT: HCPCS | Performed by: RADIOLOGY

## 2020-12-30 NOTE — PROGRESS NOTES
RE-EVALUATION    PATIENT:                                                      Prince Wesley  :                                                          1965  DATE:                          2020   DIAGNOSIS:     Prostate cancer (CMS/[facility])  - Stage IIB (cT1c, cN0, cM0, PSA: 15.1, Grade Group: 2)     BRIEF HISTORY:  The patient is a very pleasant 55 y.o. male  with an unfavorable intermediate risk prostate cancer.  I last saw him approximately 1 month ago when we discussed several different treatment options, and based on some borderline enlarged pelvic adenopathy, we decided that the best approach was to treat him with a short course of pelvic IMRT to a dose of 46 Sanderson followed by a CyberKnife boost to the prostate.  He has since underwent gold seed fiducial placement and returns today for reevaluation.  He denies any new symptoms, and his IPSS score today is 4 which is unchanged from previously.    No Known Allergies    Review of Systems   All other systems reviewed and are negative.           IPSS Questionnaire (AUA-7):  Over the past month…     1)  Incomplete Emptying  How often have you had a sensation of not emptying your bladder?  0 - Not at all   2)  Frequency  How often have you had to urinate less than every two hours? 2 - Less than half the time   3)  Intermittency  How often have you found you stopped and started again several times when you urinated?  0 - Not at all   4) Urgency  How often have you found it difficult to postpone urination?  1 - Less than 1 time in 5   5) Weak Stream  How often have you had a weak urinary stream?  1 - Less than 1 time in 5   6) Straining  How often have you had to push or strain to begin urination?  0 - Not at all   7) Nocturia  How many times did you typically get up at night to urinate?  0 - None   Total Score:  4         Quality of life due to urinary symptoms:  If you were to spend the rest of your life with your urinary condition the way it is now, how  would you feel about that? 1-Pleased   Urine Leakage (Incontinence) 0-No Leakage      Sexual Health Inventory  Current Status     1)  How do you rate your confidence that you could achieve and keep an erection? 3-Moderate   2) When you had erections with sexual stimulation, how often were your erections hard enough for penetration (entering your partner)? 5-Almost always or always   3)  During sexual intercourse, how often were you able to maintain your erection after you had penetrated (entered) into your partner? 5-Almost always or always   4) During sexual intercourse, how difficult was it to maintain your erection to completion of intercourse? 4-Slightly difficult   5) When you attempted sexual intercourse, how often was it satisfactory to you? 5-Almost always or always   Total Score: 22         Bowel Health Inventory  Current Status: 0-No problems, no rectal bleeding, no discharge, less then 5 bowel movements a day       Objective   VITAL SIGNS:   Vitals:    12/30/20 1405   BP: 152/97   Pulse: 77   Resp: 18   Temp: 97 °F (36.1 °C)   TempSrc: Temporal   SpO2: 98%   Weight: 84.7 kg (186 lb 12.8 oz)   PainSc: 0-No pain        KPS 80%    Physical Exam  Vitals signs and nursing note reviewed.   Constitutional:       General: He is not in acute distress.     Appearance: He is well-developed.   HENT:      Head: Normocephalic and atraumatic.   Eyes:      Conjunctiva/sclera: Conjunctivae normal.      Pupils: Pupils are equal, round, and reactive to light.   Neck:      Musculoskeletal: Normal range of motion and neck supple.   Cardiovascular:      Rate and Rhythm: Normal rate and regular rhythm.      Heart sounds: No murmur. No friction rub.   Pulmonary:      Effort: Pulmonary effort is normal.      Breath sounds: Normal breath sounds. No wheezing.   Abdominal:      General: Bowel sounds are normal. There is no distension.      Palpations: Abdomen is soft. There is no mass.      Tenderness: There is no abdominal  tenderness.   Musculoskeletal: Normal range of motion.   Lymphadenopathy:      Cervical: No cervical adenopathy.   Skin:     General: Skin is warm and dry.   Neurological:      Mental Status: He is alert and oriented to person, place, and time.   Psychiatric:         Behavior: Behavior normal.         Thought Content: Thought content normal.         Judgment: Judgment normal.            The following portions of the patient's history were reviewed and updated as appropriate: allergies, current medications, past family history, past medical history, past social history, past surgical history and problem list.    Diagnoses and all orders for this visit:    Prostate cancer (CMS/[facility])      IMPRESSION:  Mr. Wesley is a 55 year old male with a new diagnosis of a stage T1c, Edyta 3+4=7 prostate cancer with a pretreatment PSA of 15.1 ng/ml.  He has completed radiation treatment planning and I anticipate treating the prostate, seminal vesicles, and at risk lymph nodes to a dose of 46 Gray in 23 fractions using IMRT.  He will then have a short break, and we will proceed with a Cyberknife Radiosurgery boost to the prostate and seminal vesicles.  He should be ready to begin treatment in the next week to 10 days.    RECOMMENDATIONS:    Return to clinic in 1 week to 10 days to begin radiation treatments.         Jesse Piedra MD

## 2021-01-04 ENCOUNTER — HOSPITAL ENCOUNTER (OUTPATIENT)
Dept: RADIATION ONCOLOGY | Facility: HOSPITAL | Age: 56
Setting detail: RADIATION/ONCOLOGY SERIES
Discharge: HOME | End: 2021-01-04

## 2021-01-12 PROCEDURE — 77300 RADIATION THERAPY DOSE PLAN: CPT | Performed by: RADIOLOGY

## 2021-01-12 PROCEDURE — 77301 RADIOTHERAPY DOSE PLAN IMRT: CPT | Performed by: RADIOLOGY

## 2021-01-12 PROCEDURE — 77338 DESIGN MLC DEVICE FOR IMRT: CPT | Performed by: RADIOLOGY

## 2021-01-19 ENCOUNTER — DOCUMENTATION (OUTPATIENT)
Dept: NUTRITION | Facility: HOSPITAL | Age: 56
End: 2021-01-19

## 2021-01-19 ENCOUNTER — HOSPITAL ENCOUNTER (OUTPATIENT)
Dept: RADIATION ONCOLOGY | Facility: HOSPITAL | Age: 56
Discharge: HOME | End: 2021-01-19

## 2021-01-19 VITALS — WEIGHT: 190.5 LBS | BODY MASS INDEX: 28.12 KG/M2

## 2021-01-19 PROCEDURE — 77385: CPT | Performed by: RADIOLOGY

## 2021-01-19 NOTE — PROGRESS NOTES
ONC Nutrition    Diagnosis:  Stage IIB prostate cancer  Treatment: 46 Sanderson in 23 fractions to the pelvis followed by 21 Sanderson in 3 fractions using CyberKnife to the prostate alone    Weight 190.5 lbs    Initial consultation with patient during RAD ONC status checks.  Over the past year, the patient has made significant changes in his diet, losing 30 lbs and switching to mainly organic / non GMO foods.  With the changes that he made, he experienced significant decrease in his cholesterol levels, along with his weight loss and improved blood sugars.     Discussed the importance of nutrition with diagnosis and treatment, reinforcing the positive changes he has made over the past year.  Discussed possible nutritionally related side effects of treatment including gas, bowel changes and fatigue; addressed all questions.      Provided patient education for diet and lifestyle recommendations for prostate cancer survivorship.      Will follow through treatment.

## 2021-01-20 ENCOUNTER — HOSPITAL ENCOUNTER (OUTPATIENT)
Dept: RADIATION ONCOLOGY | Facility: HOSPITAL | Age: 56
Discharge: HOME | End: 2021-01-20

## 2021-01-20 PROCEDURE — 77385: CPT | Performed by: RADIOLOGY

## 2021-01-21 ENCOUNTER — HOSPITAL ENCOUNTER (OUTPATIENT)
Dept: RADIATION ONCOLOGY | Facility: HOSPITAL | Age: 56
Discharge: HOME | End: 2021-01-21

## 2021-01-21 DIAGNOSIS — C61 PROSTATE CANCER (HCC): Primary | ICD-10-CM

## 2021-01-21 PROCEDURE — 77385: CPT | Performed by: RADIOLOGY

## 2021-01-21 PROCEDURE — 77336 RADIATION PHYSICS CONSULT: CPT | Performed by: RADIOLOGY

## 2021-01-22 ENCOUNTER — HOSPITAL ENCOUNTER (OUTPATIENT)
Dept: RADIATION ONCOLOGY | Facility: HOSPITAL | Age: 56
Discharge: HOME | End: 2021-01-22

## 2021-01-22 PROCEDURE — 77385: CPT | Performed by: RADIOLOGY

## 2021-01-25 ENCOUNTER — HOSPITAL ENCOUNTER (OUTPATIENT)
Dept: RADIATION ONCOLOGY | Facility: HOSPITAL | Age: 56
Discharge: HOME | End: 2021-01-25

## 2021-01-25 PROCEDURE — 77385: CPT | Performed by: RADIOLOGY

## 2021-01-26 ENCOUNTER — HOSPITAL ENCOUNTER (OUTPATIENT)
Dept: RADIATION ONCOLOGY | Facility: HOSPITAL | Age: 56
Discharge: HOME | End: 2021-01-26

## 2021-01-26 VITALS — WEIGHT: 195 LBS | BODY MASS INDEX: 28.78 KG/M2

## 2021-01-26 PROCEDURE — 77385: CPT | Performed by: RADIOLOGY

## 2021-01-27 ENCOUNTER — HOSPITAL ENCOUNTER (OUTPATIENT)
Dept: RADIATION ONCOLOGY | Facility: HOSPITAL | Age: 56
Discharge: HOME | End: 2021-01-27

## 2021-01-27 PROCEDURE — 77385: CPT | Performed by: RADIOLOGY

## 2021-01-28 ENCOUNTER — HOSPITAL ENCOUNTER (OUTPATIENT)
Dept: RADIATION ONCOLOGY | Facility: HOSPITAL | Age: 56
Discharge: HOME | End: 2021-01-28

## 2021-01-28 PROCEDURE — 77336 RADIATION PHYSICS CONSULT: CPT | Performed by: RADIOLOGY

## 2021-01-28 PROCEDURE — 77385: CPT | Performed by: RADIOLOGY

## 2021-01-29 ENCOUNTER — HOSPITAL ENCOUNTER (OUTPATIENT)
Dept: RADIATION ONCOLOGY | Facility: HOSPITAL | Age: 56
Discharge: HOME | End: 2021-01-29

## 2021-01-29 PROCEDURE — 77385: CPT | Performed by: RADIOLOGY

## 2021-02-01 ENCOUNTER — HOSPITAL ENCOUNTER (OUTPATIENT)
Dept: RADIATION ONCOLOGY | Facility: HOSPITAL | Age: 56
Setting detail: RADIATION/ONCOLOGY SERIES
Discharge: HOME | End: 2021-02-01

## 2021-02-01 ENCOUNTER — HOSPITAL ENCOUNTER (OUTPATIENT)
Dept: RADIATION ONCOLOGY | Facility: HOSPITAL | Age: 56
Discharge: HOME | End: 2021-02-01

## 2021-02-01 PROCEDURE — 77385: CPT | Performed by: RADIOLOGY

## 2021-02-02 ENCOUNTER — HOSPITAL ENCOUNTER (OUTPATIENT)
Dept: RADIATION ONCOLOGY | Facility: HOSPITAL | Age: 56
Discharge: HOME | End: 2021-02-02

## 2021-02-02 VITALS — WEIGHT: 196.1 LBS | BODY MASS INDEX: 28.95 KG/M2

## 2021-02-02 PROCEDURE — 77385: CPT | Performed by: RADIOLOGY

## 2021-02-03 ENCOUNTER — HOSPITAL ENCOUNTER (OUTPATIENT)
Dept: RADIATION ONCOLOGY | Facility: HOSPITAL | Age: 56
Discharge: HOME | End: 2021-02-03

## 2021-02-03 PROCEDURE — 77385: CPT | Performed by: RADIOLOGY

## 2021-02-04 PROCEDURE — 77336 RADIATION PHYSICS CONSULT: CPT | Performed by: RADIOLOGY

## 2021-02-04 PROCEDURE — 77385: CPT | Performed by: RADIOLOGY

## 2021-02-05 ENCOUNTER — HOSPITAL ENCOUNTER (OUTPATIENT)
Dept: RADIATION ONCOLOGY | Facility: HOSPITAL | Age: 56
Discharge: HOME | End: 2021-02-05

## 2021-02-05 PROCEDURE — 77385: CPT | Performed by: RADIOLOGY

## 2021-02-08 ENCOUNTER — HOSPITAL ENCOUNTER (OUTPATIENT)
Dept: RADIATION ONCOLOGY | Facility: HOSPITAL | Age: 56
Discharge: HOME | End: 2021-02-08

## 2021-02-08 PROCEDURE — 77385: CPT | Performed by: RADIOLOGY

## 2021-02-09 ENCOUNTER — HOSPITAL ENCOUNTER (OUTPATIENT)
Dept: RADIATION ONCOLOGY | Facility: HOSPITAL | Age: 56
Discharge: HOME | End: 2021-02-09

## 2021-02-09 VITALS — WEIGHT: 194.1 LBS | BODY MASS INDEX: 28.65 KG/M2

## 2021-02-09 PROCEDURE — 77385: CPT | Performed by: RADIOLOGY

## 2021-02-10 ENCOUNTER — HOSPITAL ENCOUNTER (OUTPATIENT)
Dept: RADIATION ONCOLOGY | Facility: HOSPITAL | Age: 56
Discharge: HOME | End: 2021-02-10

## 2021-02-10 PROCEDURE — 77385: CPT | Performed by: RADIOLOGY

## 2021-02-11 ENCOUNTER — HOSPITAL ENCOUNTER (OUTPATIENT)
Dept: RADIATION ONCOLOGY | Facility: HOSPITAL | Age: 56
Discharge: HOME | End: 2021-02-11

## 2021-02-11 PROCEDURE — 77336 RADIATION PHYSICS CONSULT: CPT | Performed by: RADIOLOGY

## 2021-02-11 PROCEDURE — 77385: CPT | Performed by: RADIOLOGY

## 2021-02-15 ENCOUNTER — HOSPITAL ENCOUNTER (OUTPATIENT)
Dept: RADIATION ONCOLOGY | Facility: HOSPITAL | Age: 56
Discharge: HOME | End: 2021-02-15

## 2021-02-15 PROCEDURE — 77385: CPT | Performed by: RADIOLOGY

## 2021-02-16 ENCOUNTER — HOSPITAL ENCOUNTER (OUTPATIENT)
Dept: RADIATION ONCOLOGY | Facility: HOSPITAL | Age: 56
Discharge: HOME | End: 2021-02-16

## 2021-02-16 VITALS — BODY MASS INDEX: 28.98 KG/M2 | WEIGHT: 196.3 LBS

## 2021-02-16 PROCEDURE — 77385: CPT | Performed by: RADIOLOGY

## 2021-02-17 ENCOUNTER — HOSPITAL ENCOUNTER (OUTPATIENT)
Dept: RADIATION ONCOLOGY | Facility: HOSPITAL | Age: 56
Discharge: HOME | End: 2021-02-17

## 2021-02-17 PROCEDURE — 77385: CPT | Performed by: RADIOLOGY

## 2021-02-18 ENCOUNTER — HOSPITAL ENCOUNTER (OUTPATIENT)
Dept: RADIATION ONCOLOGY | Facility: HOSPITAL | Age: 56
Discharge: HOME | End: 2021-02-18

## 2021-02-18 PROCEDURE — 77336 RADIATION PHYSICS CONSULT: CPT | Performed by: RADIOLOGY

## 2021-02-18 PROCEDURE — 77385: CPT | Performed by: RADIOLOGY

## 2021-02-19 ENCOUNTER — HOSPITAL ENCOUNTER (OUTPATIENT)
Dept: RADIATION ONCOLOGY | Facility: HOSPITAL | Age: 56
Discharge: HOME | End: 2021-02-19

## 2021-02-19 PROCEDURE — 77385: CPT | Performed by: RADIOLOGY

## 2021-03-04 ENCOUNTER — DOCUMENTATION (OUTPATIENT)
Dept: NUTRITION | Facility: HOSPITAL | Age: 56
End: 2021-03-04

## 2021-03-04 NOTE — PROGRESS NOTES
ONC Nutrition    Phone consult with patient regarding the Gas Elimination Diet and instructions in preparation for the imaging scans and CK treatment for prostate cancer.  Reviewed the rationale for the diet modification, gas forming foods, schedule for following, GasX, enemas and MRI x6 hrs prior to MRI.  Patient verbalized excellent comprehension of diet; all questions were addressed.

## 2021-03-08 ENCOUNTER — APPOINTMENT (OUTPATIENT)
Dept: MRI IMAGING | Facility: HOSPITAL | Age: 56
End: 2021-03-08

## 2021-03-08 ENCOUNTER — HOSPITAL ENCOUNTER (OUTPATIENT)
Dept: RADIATION ONCOLOGY | Facility: HOSPITAL | Age: 56
Setting detail: RADIATION/ONCOLOGY SERIES
Discharge: HOME | End: 2021-03-08

## 2021-03-08 DIAGNOSIS — C61 PROSTATE CANCER (HCC): Primary | ICD-10-CM

## 2021-03-19 ENCOUNTER — OFFICE VISIT (OUTPATIENT)
Dept: RADIATION ONCOLOGY | Facility: HOSPITAL | Age: 56
End: 2021-03-19

## 2021-03-19 ENCOUNTER — HOSPITAL ENCOUNTER (OUTPATIENT)
Dept: RADIATION ONCOLOGY | Facility: HOSPITAL | Age: 56
Discharge: HOME | End: 2021-03-19

## 2021-03-19 ENCOUNTER — HOSPITAL ENCOUNTER (OUTPATIENT)
Dept: MRI IMAGING | Facility: HOSPITAL | Age: 56
Discharge: HOME | End: 2021-03-19
Admitting: RADIOLOGY

## 2021-03-19 VITALS
SYSTOLIC BLOOD PRESSURE: 136 MMHG | TEMPERATURE: 97.3 F | DIASTOLIC BLOOD PRESSURE: 87 MMHG | RESPIRATION RATE: 18 BRPM | BODY MASS INDEX: 28.84 KG/M2 | WEIGHT: 195.4 LBS | HEART RATE: 73 BPM | OXYGEN SATURATION: 97 %

## 2021-03-19 DIAGNOSIS — C61 PROSTATE CANCER (HCC): ICD-10-CM

## 2021-03-19 PROCEDURE — G0463 HOSPITAL OUTPT CLINIC VISIT: HCPCS | Performed by: RADIOLOGY

## 2021-03-19 PROCEDURE — 72195 MRI PELVIS W/O DYE: CPT

## 2021-03-19 RX ORDER — TAMSULOSIN HYDROCHLORIDE 0.4 MG/1
1 CAPSULE ORAL DAILY
Qty: 30 CAPSULE | Refills: 2 | Status: SHIPPED | OUTPATIENT
Start: 2021-03-19 | End: 2021-08-02 | Stop reason: SDUPTHER

## 2021-03-19 NOTE — PROGRESS NOTES
RE-EVALUATION    PATIENT:                                                      Prince Wesley  :                                                          1965  DATE:                          3/19/2021   DIAGNOSIS:     Prostate cancer (CMS/[facility])  - Stage IIB (cT1c, cN0, cM0, PSA: 15.1, Grade Group: 2)     BRIEF HISTORY:  The patient is a very pleasant 55 y.o. male  with a high-volume intermediate risk prostate cancer.  He has been receiving treatment consisting of pelvic radiation therapy, for which she just recently completed the initial pelvic feels to 46 Sanderson in 23 fractions using IMRT.  Who presents today for reevaluation in anticipation of treating him with a conformal boost using stereotactic body radiation therapy to the prostate and seminal vesicles.  Since completing his initial fields, he states that he has had some symptoms of urinary urgency and frequency, but this has mostly been stable for the past week.  He does feel like he is getting close to back to normal.  His IPSS score today is 10 with symptoms of intermittency and frequency as the most dominant symptoms.  He denies any other gastrointestinal complaints.    No Known Allergies    Review of Systems   Genitourinary: Positive for frequency.    All other systems reviewed and are negative.           IPSS Questionnaire (AUA-7):  Over the past month…    1)  Incomplete Emptying  How often have you had a sensation of not emptying your bladder?  0 - Not at all   2)  Frequency  How often have you had to urinate less than every two hours? 3 - About half the time   3)  Intermittency  How often have you found you stopped and started again several times when you urinated?  4 - More than half the time   4) Urgency  How often have you found it difficult to postpone urination?  1 - Less than 1 time in 5   5) Weak Stream  How often have you had a weak urinary stream?  1 - Less than 1 time in 5   6) Straining  How often have you had to push or strain to begin  urination?  0 - Not at all   7) Nocturia  How many times did you typically get up at night to urinate?  1 - 1 time   Total Score:  10       Quality of life due to urinary symptoms:  If you were to spend the rest of your life with your urinary condition the way it is now, how would you feel about that? 1-Pleased   Urine Leakage (Incontinence) 0-No Leakage     Sexual Health Inventory  Current Status    1)  How do you rate your confidence that you could achieve and keep an erection? 3-Moderate   2) When you had erections with sexual stimulation, how often were your erections hard enough for penetration (entering your partner)? 5-Almost always or always   3)  During sexual intercourse, how often were you able to maintain your erection after you had penetrated (entered) into your partner? 5-Almost always or always   4) During sexual intercourse, how difficult was it to maintain your erection to completion of intercourse? 4-Slightly difficult   5) When you attempted sexual intercourse, how often was it satisfactory to you? 5-Almost always or always   Total Score: 22       Bowel Health Inventory  Current Status: 0-No problems, no rectal bleeding, no discharge, less then 5 bowel movements a day             Objective   VITAL SIGNS:   Vitals:    03/19/21 0903   BP: 136/87   Pulse: 73   Resp: 18   Temp: 97.3 °F (36.3 °C)   TempSrc: Temporal   SpO2: 97%   Weight: 88.6 kg (195 lb 6.4 oz)   PainSc: 0-No pain        KPS 80%    Physical Exam  Vitals and nursing note reviewed.   Constitutional:       General: He is not in acute distress.     Appearance: He is well-developed.   HENT:      Head: Normocephalic and atraumatic.   Eyes:      Conjunctiva/sclera: Conjunctivae normal.      Pupils: Pupils are equal, round, and reactive to light.   Cardiovascular:      Rate and Rhythm: Normal rate and regular rhythm.      Heart sounds: No murmur heard.   No friction rub.   Pulmonary:      Effort: Pulmonary effort is normal.      Breath sounds:  Normal breath sounds. No wheezing.   Abdominal:      General: Bowel sounds are normal. There is no distension.      Palpations: Abdomen is soft. There is no mass.      Tenderness: There is no abdominal tenderness.   Musculoskeletal:         General: Normal range of motion.      Cervical back: Normal range of motion and neck supple.   Lymphadenopathy:      Cervical: No cervical adenopathy.   Skin:     General: Skin is warm and dry.   Neurological:      Mental Status: He is alert and oriented to person, place, and time.   Psychiatric:         Behavior: Behavior normal.         Thought Content: Thought content normal.         Judgment: Judgment normal.              The following portions of the patient's history were reviewed and updated as appropriate: allergies, current medications, past family history, past medical history, past social history, past surgical history and problem list.    Diagnoses and all orders for this visit:    Prostate cancer (CMS/[facility])    Other orders  -     tamsulosin (FLOMAX) 0.4 MG capsule 24 hr capsule; Take 1 capsule by mouth Daily.      IMPRESSION:  Mr. Wesley is a 55 year old gentleman with an intermediate risk prostate cancer.  He has already completed initial pelvic fields which he has tolerated very well.  We completed a radiation simulation session today for the final portion of therapy, which will be a conformal boost to the prostate and seminal vesicles using SBRT.  I again discussed with him the plans including the importance of the low gas diet, and daily bowel prep, use of Gas-X, as well as use of alpha blockers.  I sent in a prescription today for Flomax with instructions to start taking this 2 days prior to his SBRT treatments.  We will aim to have his treatments ready to begin within the next 2 weeks.    Mr. Wesley requires a more conformal boost to the prostate and proximal seminal vesicles, which we intend to deliver using stereotactic body radiation therapy.  Given the higher  doses utilized and the fact that he has already received radiation to the pelvis, there is inherently higher risk and complexity, which necessitates a new radiation treatment plan using a new image dataset, as well as an MRI fusion in order to generate contours of both the target volumes and organs at risk.  A standard treatment procedure modifier is warranted and will be billed as a component of this new treatment plan.     RECOMMENDATIONS:   Return to clinic in 2 weeks to begin Cyberknife treatments.         Jesse Piedra MD

## 2021-03-22 PROCEDURE — 77470 SPECIAL RADIATION TREATMENT: CPT | Performed by: RADIOLOGY

## 2021-03-24 PROCEDURE — 77338 DESIGN MLC DEVICE FOR IMRT: CPT | Performed by: RADIOLOGY

## 2021-03-24 PROCEDURE — 77301 RADIOTHERAPY DOSE PLAN IMRT: CPT | Performed by: RADIOLOGY

## 2021-03-24 PROCEDURE — 77300 RADIATION THERAPY DOSE PLAN: CPT | Performed by: RADIOLOGY

## 2021-04-05 ENCOUNTER — HOSPITAL ENCOUNTER (OUTPATIENT)
Dept: RADIATION ONCOLOGY | Facility: HOSPITAL | Age: 56
Setting detail: RADIATION/ONCOLOGY SERIES
Discharge: HOME | End: 2021-04-05

## 2021-04-05 ENCOUNTER — HOSPITAL ENCOUNTER (OUTPATIENT)
Dept: RADIATION ONCOLOGY | Facility: HOSPITAL | Age: 56
Discharge: HOME | End: 2021-04-05

## 2021-04-05 PROCEDURE — 77385: CPT | Performed by: RADIOLOGY

## 2021-04-09 ENCOUNTER — HOSPITAL ENCOUNTER (OUTPATIENT)
Dept: RADIATION ONCOLOGY | Facility: HOSPITAL | Age: 56
Discharge: HOME | End: 2021-04-09

## 2021-04-09 PROCEDURE — 77385: CPT | Performed by: RADIOLOGY

## 2021-04-10 ENCOUNTER — HOSPITAL ENCOUNTER (OUTPATIENT)
Dept: RADIATION ONCOLOGY | Facility: HOSPITAL | Age: 56
Discharge: HOME | End: 2021-04-10

## 2021-04-10 PROCEDURE — 77385: CPT | Performed by: RADIOLOGY

## 2021-04-10 PROCEDURE — 77336 RADIATION PHYSICS CONSULT: CPT | Performed by: RADIOLOGY

## 2021-04-15 NOTE — RADIATION COMPLETION NOTES
DATE OF COMPLETION: 2/19/2021  DIAGNOSIS: Prostate Cancer  Cancer Staging  Stage IIB (cT1c, cN0, cM0, PSA: 15.1, Grade Group: 2)    REFERRING: Ryley Bobby MD        Prince Li completed radiation therapy today.      BACKGROUND: Prince Wesley is a 55-year-old gentleman who is been diagnosed with a unfavorable intermediate risk prostate cancer, and was found to have a mildly enlarged left lower pelvic lymph node.  He was treated with a course of pelvic IMRT followed by an SBRT boost to the prostate.  The details of his IMRT treatments are as detailed below:    Treatment Summary     Dates of Therapy: 1/19/2021-2/19/2021  Treatment Site: Prostate, seminal vesicles, and at risk lymph node groups, with additional dose delivered to the left internal iliac lymph node.  Dose: 46 Gy in 23 fractions of 2 Gy each  Technique: Helical IMRT was utilized to treat the patient's pelvis with care taken to minimize the dose delivered to the uninvolved small bowel, rectum, and bladder.    Treatment Course and Tolerance: He tolerated radiation treatments fairly well.  He did develop the anticipated grade 1 fatigue, loose stool, and mild lower urinary tract symptoms.  While these were managed conservatively and he was able to recover at the end of treatment.    The initial follow up visit will be in 1 month.    Prince Wesley knows to call if any problems or concerns develop in the meantime.     Electronically signed by: Jesse Piedra MD                    Cc: Amy Thomas MD

## 2021-05-11 ENCOUNTER — OFFICE VISIT (OUTPATIENT)
Dept: RADIATION ONCOLOGY | Facility: HOSPITAL | Age: 56
End: 2021-05-11

## 2021-05-11 ENCOUNTER — HOSPITAL ENCOUNTER (OUTPATIENT)
Dept: RADIATION ONCOLOGY | Facility: HOSPITAL | Age: 56
Setting detail: RADIATION/ONCOLOGY SERIES
Discharge: HOME | End: 2021-05-11

## 2021-05-11 DIAGNOSIS — C61 PROSTATE CANCER (HCC): Primary | ICD-10-CM

## 2021-05-11 NOTE — PROGRESS NOTES
TELEMEDICINE FOLLOW UP NOTE    PATIENT:                                                      Prince Wesley  MEDICAL RECORD #:                        [id number]  :                                                          1965  COMPLETION DATE:   4/10/2021  DIAGNOSIS:     Prostate cancer (CMS/[facility])  - Stage IIB (cT1c, cN0, cM0, PSA: 15.1, Grade Group: 2)    This visit has been rescheduled as a phone visit to comply with patient safety concerns in accordance with CDC recommendations in light of the COVID-19 pandemic. Total time of discussion was 21 minutes.  Verbal consent given.    COVID-19 RISK SCREEN     1. Has the patient had close contact without PPE with a lab confirmed COVID-19 (+) person or a person under investigation (PUI) for COVID-19 infection?  -- No     2. Has the patient had respiratory symptoms, worsened/new cough and/or SOA, unexplained fever, or sudden loss of smell and/or taste in the past 7 days? --  No    3. Does the patient have baseline higher exposure risk such as working in healthcare field or currently residing in healthcare facility?  --  No         BRIEF HISTORY:  Prince Wesley is a 55 y.o. gentleman with an unfavorable intermediate risk prostate cancer.  He underwent definitive treatment with a short course of pelvic IMRT to a dose of 46 Gy, followed by a more conformal boost to the prostate and seminal vesicles to a dose of 21 Gy on the CyberKnife.  He tolerated treatment well.  Grade 1 fatigue continues to amber.  He remains active and worked daily.  He reports symptoms of increased urinary urgency, frequency, and incomplete emptying following completion of his initial fields, which continue to persist.  He notes nocturia x1.  He continues on Flomax nightly.  He experienced a brief duration of dysuria which has since resolved.  He reports grade 1 loose stools/diarrhea which occurred intermittently toward the end of treatment and subsided about 1 week ago.  He reports satisfactory  erectile function which is unchanged.  He does note some joint soreness within his hips and hands, unrelated to treatment.  He otherwise feels well and has no acute concerns today.          MEDICATIONS: Medication reconciliation for the patient was reviewed and confirmed in the electronic medical record.    Review of Systems   Constitutional: Positive for fatigue.   Genitourinary: Positive for frequency.    Musculoskeletal: Positive for arthralgias.   All other systems reviewed and are negative.      KPS 90%    Physical Exam  Pulmonary:      Respirations even, unlabored. No audible wheezing or cough.  Neurological:      A+Ox4, conversant, answers questions appropriately.  Psychiatric:     Judgement, affect, and decision-making WNL.    Limited physical exam as visit was conducted remotely via telephone in light of the COVID-19 pandemic.          The following portions of the patient's history were reviewed and updated as appropriate: allergies, current medications, past family history, past medical history, past social history, past surgical history and problem list.         Diagnoses and all orders for this visit:    1. Prostate cancer (CMS/[facility]) (Primary)         IMPRESSION:   Mr. Wesley is a 55 y.o. gentleman with recent diagnosis of a stage T1c, Edyta 3+4 = 7 prostate cancer with a pretreatment PSA of 15.1 NG/mL.  He underwent a course of combined pelvic radiation with subsequent conformal boost using stereotactic body radiotherapy to the prostate and seminal vesicles, which he completed 1 month ago.  He tolerated treatment well.  Acute residual radiation-related toxicities continue to amber.  We discussed plan to taper/discontinue Flomax as tolerated.  Mr. Wesley and CHILO have reviewed the survivorship care plan in detail.  We discussed follow-up intervals, PSA monitoring, and expectations for response to treatment.  A copy of the care plan was mailed to the patient.  A copy was also sent to his  PCP.    RECOMMENDATIONS:  Mr. Wesley continues urologic surveillance under the care of Dr. Bobby, with follow-up and repeat PSA scheduled 6/21/2021.    Return in about 6 months (around 11/11/2021) for Office Visit.    Candice Betancourt, APRN

## 2021-06-16 ENCOUNTER — HOSPITAL ENCOUNTER (OUTPATIENT)
Dept: CT IMAGING | Facility: HOSPITAL | Age: 56
Discharge: HOME | End: 2021-06-16

## 2021-06-16 DIAGNOSIS — C61 PROSTATE CANCER (HCC): ICD-10-CM

## 2021-06-17 ENCOUNTER — APPOINTMENT (OUTPATIENT)
Dept: CT IMAGING | Facility: HOSPITAL | Age: 56
End: 2021-06-17

## 2021-07-01 ENCOUNTER — HOSPITAL ENCOUNTER (OUTPATIENT)
Dept: CT IMAGING | Facility: HOSPITAL | Age: 56
Discharge: HOME | End: 2021-07-01
Admitting: UROLOGY

## 2021-07-01 PROCEDURE — 74177 CT ABD & PELVIS W/CONTRAST: CPT

## 2021-07-01 PROCEDURE — 25010000002 IOPAMIDOL 61 % SOLUTION: Performed by: UROLOGY

## 2021-07-01 RX ADMIN — IOPAMIDOL 100 ML: 612 INJECTION, SOLUTION INTRAVENOUS at 07:10

## 2021-07-19 RX ORDER — CITALOPRAM 20 MG/1
20 TABLET ORAL DAILY
Qty: 30 TABLET | Refills: 1 | OUTPATIENT
Start: 2021-07-19

## 2021-07-19 RX ORDER — CITALOPRAM 20 MG/1
20 TABLET ORAL DAILY
Qty: 30 TABLET | Refills: 1 | Status: SHIPPED | OUTPATIENT
Start: 2021-07-19 | End: 2021-08-23 | Stop reason: SDUPTHER

## 2021-07-19 NOTE — TELEPHONE ENCOUNTER
Caller: Prince Wesley    Relationship: Self    Best call back number: [phone]    Medication needed:   Requested Prescriptions     Pending Prescriptions Disp Refills   • citalopram (CeleXA) 20 MG tablet 30 tablet 1     Sig: Take 1 tablet by mouth Daily.       When do you need the refill by: ASAP    What additional details did the patient provide when requesting the medication: PATIENT STATES THE PRESCRIPTION  BEFORE GETTING THE REFILLS    Does the patient have less than a 3 day supply:  [x] Yes  [] No    What is the patient's preferred pharmacy: [id number] [place], [place] - [facility] [place] [facility] [place] [address] - [phone]  - [phone] FX

## 2021-08-02 ENCOUNTER — TELEPHONE (OUTPATIENT)
Dept: RADIATION ONCOLOGY | Facility: HOSPITAL | Age: 56
End: 2021-08-02

## 2021-08-02 DIAGNOSIS — C61 PROSTATE CANCER (HCC): ICD-10-CM

## 2021-08-02 RX ORDER — TAMSULOSIN HYDROCHLORIDE 0.4 MG/1
1 CAPSULE ORAL DAILY
Qty: 30 CAPSULE | Refills: 5 | Status: SHIPPED | OUTPATIENT
Start: 2021-08-02

## 2021-08-02 NOTE — TELEPHONE ENCOUNTER
Pt called stated he needed a refill on his flomax.  He said he tried going without it, but his symptoms returned.  Discussed with CATHLEEN Brown and script was sent.  Pt verbalized understanding.

## 2021-08-09 ENCOUNTER — LAB (OUTPATIENT)
Dept: FAMILY MEDICINE CLINIC | Facility: CLINIC | Age: 56
End: 2021-08-09

## 2021-08-12 ENCOUNTER — OFFICE VISIT (OUTPATIENT)
Dept: UROLOGY | Facility: CLINIC | Age: 56
End: 2021-08-12

## 2021-08-12 VITALS
RESPIRATION RATE: 20 BRPM | HEART RATE: 81 BPM | OXYGEN SATURATION: 97 % | BODY MASS INDEX: 28.88 KG/M2 | DIASTOLIC BLOOD PRESSURE: 80 MMHG | WEIGHT: 195 LBS | HEIGHT: 69 IN | SYSTOLIC BLOOD PRESSURE: 140 MMHG

## 2021-08-12 DIAGNOSIS — C61 PROSTATE CANCER (HCC): Primary | ICD-10-CM

## 2021-08-12 PROCEDURE — 99214 OFFICE O/P EST MOD 30 MIN: CPT | Performed by: UROLOGY

## 2021-08-12 NOTE — PROGRESS NOTES
Chief Complaint  Prostate cancer    HPI  Mr. Wesley is a 55 y.o.  male with intermediate risk PCa, s/p SBRT March 2021.     He is overall doing well. He does not have any gross hematuria, however he does have some urgency and frequency with mild dysuria in the morning. He is back on the Flomax and says it helps with this.    Past Medical History  Past Medical History:   Diagnosis Date   • GERD (gastroesophageal reflux disease)    • Headache    • History of radiation therapy 04/10/2021    pelvic IMRT + SBRT boost to prostate/SV   • Hypertension    • Mixed hyperlipidemia 12/17/2019   • Prostate cancer (CMS/HCC)    • Skin cancer        Past Surgical History  Past Surgical History:   Procedure Laterality Date   • COLONOSCOPY      cologuard 2019   • PROSTATE BIOPSY     • PROSTATE FIDUCIAL MARKER PLACEMENT     • SQUAMOUS CELL CARCINOMA EXCISION N/A 3-4 years ago   • WISDOM TOOTH EXTRACTION         Medications    Current Outpatient Medications:   •  citalopram (CeleXA) 20 MG tablet, TAKE 1 TABLET BY MOUTH DAILY, Disp: 30 tablet, Rfl: 1  •  tamsulosin (FLOMAX) 0.4 MG capsule 24 hr capsule, Take 1 capsule by mouth Daily., Disp: 30 capsule, Rfl: 5  •  valsartan-hydrochlorothiazide (Diovan HCT) 160-25 MG per tablet, Take 1 tablet by mouth Daily., Disp: 30 tablet, Rfl: 5    Allergies  No Known Allergies    Social History  Social History     Tobacco Use   • Smoking status: Never Smoker   • Smokeless tobacco: Never Used   Vaping Use   • Vaping Use: Never used   Substance Use Topics   • Alcohol use: Yes   • Drug use: No       Family History  Family History   Problem Relation Age of Onset   • Skin cancer Father    • Prostate cancer Father    • Prostate cancer Paternal Grandfather         Review of Systems  Constitutional: No fevers or chills  Skin: Negative for rash  Endocrine: No heat/cold intolerance   Cardiovascular: Negative for chest pain or dyspnea on exertion  Respiratory: Negative for shortness of breath or  "wheezing  Gastrointestinal: No constipation, nausea or vomiting  Genitourinary: Negative for new lower urinary tract symptoms, current gross hematuria or dysuria.  Musculoskeletal: No flank pain  Neurological:  Negative for frequent headaches or dizziness  Lymph/Heme: Negative for leg swelling or calf pain.    Physical Exam  Visit Vitals  /80   Pulse 81   Resp 20   Ht 175.3 cm (69.02\")   Wt 88.5 kg (195 lb)   SpO2 97%   BMI 28.78 kg/m²     Constitutional: NAD, WDWN.   HEENT: NCAT. Conjunctivae normal.  MMM.    Cardiovascular: Regular rate.  Pulmonary/Chest: Respirations are even and non-labored bilaterally.  Abdominal: Soft. No distension, tenderness, masses or guarding. No CVA tenderness.  Neurological: A + O x 3.  Cranial Nerves II-XII grossly intact. Normal gait.  Extremities: ANTHONY x 4, Warm. No clubbing.  No cyanosis.    Skin: Pink, warm and dry.  No rashes noted.      Labs  Brief Urine Lab Results  (Last result in the past 365 days)      Color   Clarity   Blood   Leuk Est   Nitrite   Protein   CREAT   Urine HCG        09/03/20 1617 Yellow Clear Negative Negative Negative Trace               Lab Results   Component Value Date    PSA 1.4 08/10/2021    PSA 13.900 (H) 12/03/2020    PSA 15.100 (H) 08/17/2020     CT Abdomen Pelvis With Contrast  Result Date: 7/1/2021  No evidence of metastatic disease.   This study was performed with techniques to keep radiation doses as low as reasonably achievable (ALARA). Individualized dose reduction techniques using automated exposure control or adjustment of vA and/or kV according to the patient size were employed.  This report was finalized on 7/1/2021 10:24 AM by Stefan Payne MD.    I have reviewed all of his most recent labs and imaging.      Assessment  Mr. Wesley is a 55 y.o.  male with intermediate risk prostate cancer, status post SBRT finished in March 2021. He is old overall doing well but does have some continued LUTS. PSA is decreasing nicely. CT does " not demonstrate any signs of metastatic disease.    Plan  1.  FU in 6 mo w/ PSA  2.  Stop flomax 1 mo prior to next visit

## 2021-08-23 RX ORDER — CITALOPRAM 20 MG/1
20 TABLET ORAL DAILY
Qty: 30 TABLET | Refills: 1 | Status: SHIPPED | OUTPATIENT
Start: 2021-08-23 | End: 2021-09-01 | Stop reason: SDUPTHER

## 2021-08-23 NOTE — TELEPHONE ENCOUNTER
Caller: Prince Wesley    Relationship: Self    Best call back number: [phone]    Medication needed:   Requested Prescriptions     Pending Prescriptions Disp Refills   • citalopram (CeleXA) 20 MG tablet 30 tablet 1     Sig: Take 1 tablet by mouth Daily.       When do you need the refill by: 08/26/21    What additional details did the patient provide when requesting the medication: PATIENT WANTS DR. SARINA FERNANDEZ TO INCREASE HIS DOSE OR WRITE A NEW PRESCRIPTION FOR 1 IN THE MORNING AND 1 AT NIGHT. OR IF SHE THINKS A DIFFERENT MEDICATION WOULD BE BETTER PLEASE ADVISE AND CALL PATIENT [phone]     Does the patient have less than a 3 day supply:  [] Yes  [x] No    What is the patient's preferred pharmacy: [id number] - [place], [place] - [address] - [phone] [facility] [phone] FX

## 2021-09-01 ENCOUNTER — OFFICE VISIT (OUTPATIENT)
Dept: FAMILY MEDICINE CLINIC | Facility: CLINIC | Age: 56
End: 2021-09-01

## 2021-09-01 VITALS
WEIGHT: 208 LBS | TEMPERATURE: 97.3 F | BODY MASS INDEX: 30.81 KG/M2 | HEIGHT: 69 IN | HEART RATE: 78 BPM | OXYGEN SATURATION: 98 % | DIASTOLIC BLOOD PRESSURE: 80 MMHG | SYSTOLIC BLOOD PRESSURE: 132 MMHG | RESPIRATION RATE: 20 BRPM

## 2021-09-01 DIAGNOSIS — I10 ESSENTIAL HYPERTENSION: Primary | ICD-10-CM

## 2021-09-01 DIAGNOSIS — F41.1 GAD (GENERALIZED ANXIETY DISORDER): ICD-10-CM

## 2021-09-01 DIAGNOSIS — Z13.220 SCREENING FOR LIPID DISORDERS: ICD-10-CM

## 2021-09-01 DIAGNOSIS — F41.8 SITUATIONAL ANXIETY: ICD-10-CM

## 2021-09-01 DIAGNOSIS — Z51.81 MEDICATION MONITORING ENCOUNTER: ICD-10-CM

## 2021-09-01 DIAGNOSIS — R73.03 PREDIABETES: ICD-10-CM

## 2021-09-01 PROCEDURE — 99214 OFFICE O/P EST MOD 30 MIN: CPT | Performed by: FAMILY MEDICINE

## 2021-09-01 RX ORDER — VALSARTAN AND HYDROCHLOROTHIAZIDE 160; 25 MG/1; MG/1
1 TABLET ORAL DAILY
Qty: 30 TABLET | Refills: 5 | Status: SHIPPED | OUTPATIENT
Start: 2021-09-01 | End: 2022-09-12

## 2021-09-01 RX ORDER — PROPRANOLOL HYDROCHLORIDE 10 MG/1
10 TABLET ORAL 3 TIMES DAILY PRN
Qty: 90 TABLET | Refills: 2 | Status: SHIPPED | OUTPATIENT
Start: 2021-09-01

## 2021-09-01 RX ORDER — CITALOPRAM 20 MG/1
20 TABLET ORAL DAILY
Qty: 30 TABLET | Refills: 5 | Status: SHIPPED | OUTPATIENT
Start: 2021-09-01 | End: 2022-09-12

## 2021-09-01 NOTE — PROGRESS NOTES
"Subjective   Prince Wesley is a 56 y.o. male.     History of Present Illness   Mr. Wesley presents today for routine f/u and refills. I last saw him 6/2020. In the interim he has been dx'd and tx'd for prostate cancer. Underwent cyberknife as well as pelvic irradiation. Denies urinary problems at this time.    His is currently on celexa for ALDO. Having increase anxiety, work related stress. Anxiety worst at night, impairing sleep, now having chronic fatigue. Feels his heart racing/pouding when anxious.    Checking BP at home. Currently on Diovan. Taking as rx'd denies side effects. BP can be high in evening when \"stressed\" such as 182/92    Not to have prediabetes previously, some weight gain. Not following diabetic appropriate diet. Irregular exercise.    The following portions of the patient's history were reviewed and updated as appropriate: allergies, current medications, past family history, past medical history, past social history, past surgical history and problem list.    Review of Systems   Constitutional: Negative for appetite change, fatigue, fever and unexpected weight change.   HENT: Negative for congestion, ear pain, hearing loss, nosebleeds, rhinorrhea, sneezing, sore throat, tinnitus and trouble swallowing.    Eyes: Negative for pain, discharge and redness.   Respiratory: Negative for cough, chest tightness, shortness of breath and wheezing.    Cardiovascular: Positive for palpitations. Negative for chest pain and leg swelling.   Gastrointestinal: Negative for abdominal pain, blood in stool, constipation, diarrhea, nausea and vomiting.   Endocrine: Negative for cold intolerance, heat intolerance, polydipsia and polyuria.   Genitourinary: Negative for decreased urine volume, difficulty urinating, dysuria, flank pain, frequency, hematuria and urgency.   Musculoskeletal: Positive for arthralgias. Negative for back pain, joint swelling, myalgias and neck pain.   Skin: Negative for rash and wound. "   Neurological: Negative for dizziness, tremors, seizures, syncope, speech difficulty, weakness, numbness and headaches.   Hematological: Negative for adenopathy. Does not bruise/bleed easily.   Psychiatric/Behavioral: Positive for sleep disturbance (due to anxiety). Negative for confusion and dysphoric mood. The patient is nervous/anxious.    Pt's previous ROS reviewed and updated as indicated.       Objective    Vitals:    09/01/21 0824   BP: 132/80   Pulse: 78   Resp: 20   Temp: 97.3 °F (36.3 °C)   SpO2: 98%     Body mass index is 30.7 kg/m².      09/01/21 0824   Weight: 94.3 kg (208 lb)       Physical Exam  Vitals and nursing note reviewed.   Constitutional:       General: He is not in acute distress.     Appearance: He is well-developed, well-groomed and overweight. He is not ill-appearing.   HENT:      Head: Normocephalic and atraumatic.   Eyes:      General: No scleral icterus.     Extraocular Movements: Extraocular movements intact.   Neck:      Thyroid: No thyroid mass.      Vascular: Normal carotid pulses. No carotid bruit.   Cardiovascular:      Rate and Rhythm: Normal rate and regular rhythm.      Pulses: Normal pulses.      Heart sounds: Normal heart sounds.   Pulmonary:      Effort: Pulmonary effort is normal.      Breath sounds: Normal breath sounds.   Musculoskeletal:      Right lower leg: No edema.      Left lower leg: No edema.   Lymphadenopathy:      Cervical: No cervical adenopathy.   Skin:     General: Skin is warm and dry.      Coloration: Skin is not jaundiced or pale.   Neurological:      Mental Status: He is alert and oriented to person, place, and time.      Motor: No tremor.      Gait: Gait is intact.   Psychiatric:         Mood and Affect: Mood and affect normal.         Speech: Speech normal.         Behavior: Behavior normal. Behavior is cooperative.         Thought Content: Thought content normal.         Cognition and Memory: Cognition normal.         Judgment: Judgment normal.          Assessment/Plan   Diagnoses and all orders for this visit:    1. Essential hypertension (Primary)  -     propranolol (INDERAL) 10 MG tablet; Take 1 tablet by mouth 3 (Three) Times a Day As Needed (anxiety).  Dispense: 90 tablet; Refill: 2  -     valsartan-hydrochlorothiazide (Diovan HCT) 160-25 MG per tablet; Take 1 tablet by mouth Daily.  Dispense: 30 tablet; Refill: 5  -     CBC & Differential; Future  -     Comprehensive Metabolic Panel; Future  -     TSH Rfx On Abnormal To Free T4; Future    2. Situational anxiety  -     propranolol (INDERAL) 10 MG tablet; Take 1 tablet by mouth 3 (Three) Times a Day As Needed (anxiety).  Dispense: 90 tablet; Refill: 2  -     citalopram (CeleXA) 20 MG tablet; Take 1 tablet by mouth Daily.  Dispense: 30 tablet; Refill: 5  -     TSH Rfx On Abnormal To Free T4; Future    3. ALDO (generalized anxiety disorder)  -     citalopram (CeleXA) 20 MG tablet; Take 1 tablet by mouth Daily.  Dispense: 30 tablet; Refill: 5    4. Medication monitoring encounter  -     CBC & Differential; Future  -     Comprehensive Metabolic Panel; Future    5. Prediabetes  -     Comprehensive Metabolic Panel; Future  -     Hemoglobin A1c; Future    6. Screening for lipid disorders  -     Lipid Panel; Future       HTN fairly well controlled in office. Some spikes with anxiety. I have reviewed risks/benefits and potential side effects of various treatment options for situational anxiety with rapid heart rate, elevated BP. Pt voices understanding and wishes to proceed with trial of propranolol up to tid as needed. Continue Celexa for ALDO. Continue Diovan HCT for HTN. Encouraged heart healthy eating and regular exercise.    F/u with urology/oncology as scheduled.    Assess status of Pre DM, tx as indicated. Preventive measures reviewed.    F/u in 2 months for recheck, sooner as needed/instructed.  Return fasting for labs at earliest convenience.  I will contact patient regarding test results and provide  instructions regarding any necessary changes in plan of care.  Patient was encouraged to keep me informed of any acute changes, lack of improvement, or any new concerning symptoms.  Pt is aware of reasons to seek emergent care.  Patient voiced understanding of all instructions and denied further questions.

## 2021-11-11 ENCOUNTER — HOSPITAL ENCOUNTER (OUTPATIENT)
Dept: RADIATION ONCOLOGY | Facility: HOSPITAL | Age: 56
Setting detail: RADIATION/ONCOLOGY SERIES
Discharge: HOME | End: 2021-11-11

## 2022-09-11 DIAGNOSIS — F41.8 SITUATIONAL ANXIETY: ICD-10-CM

## 2022-09-11 DIAGNOSIS — I10 ESSENTIAL HYPERTENSION: ICD-10-CM

## 2022-09-11 DIAGNOSIS — F41.1 GAD (GENERALIZED ANXIETY DISORDER): ICD-10-CM

## 2022-09-12 DIAGNOSIS — I10 ESSENTIAL HYPERTENSION: ICD-10-CM

## 2022-09-12 RX ORDER — VALSARTAN AND HYDROCHLOROTHIAZIDE 160; 25 MG/1; MG/1
1 TABLET ORAL DAILY
Qty: 30 TABLET | Refills: 0 | Status: SHIPPED | OUTPATIENT
Start: 2022-09-12 | End: 2022-10-17 | Stop reason: SDUPTHER

## 2022-09-12 RX ORDER — CITALOPRAM 20 MG/1
20 TABLET ORAL DAILY
Qty: 30 TABLET | Refills: 0 | Status: SHIPPED | OUTPATIENT
Start: 2022-09-12

## 2022-09-12 RX ORDER — VALSARTAN AND HYDROCHLOROTHIAZIDE 160; 25 MG/1; MG/1
1 TABLET ORAL DAILY
Qty: 90 TABLET | OUTPATIENT
Start: 2022-09-12

## 2022-09-12 NOTE — TELEPHONE ENCOUNTER
Rx Refill Note  Requested Prescriptions     Pending Prescriptions Disp Refills   • valsartan-hydrochlorothiazide (DIOVAN-HCT) 160-25 MG per tablet [Pharmacy Med Name: VALSARTAN/HCTZ 160MG/25MG TABLETS] 30 tablet 5     Sig: TAKE 1 TABLET BY MOUTH DAILY   • citalopram (CeleXA) 20 MG tablet [Pharmacy Med Name: CITALOPRAM 20MG TABLETS] 30 tablet 5     Sig: TAKE 1 TABLET BY MOUTH DAILY      Last office visit with prescribing clinician: 9/1/2021      Next office visit with prescribing clinician: Visit date not found            Daniella Wilks MA  09/12/22, 08:12 EDT

## 2022-10-17 ENCOUNTER — TELEPHONE (OUTPATIENT)
Dept: FAMILY MEDICINE CLINIC | Facility: CLINIC | Age: 57
End: 2022-10-17

## 2022-10-17 DIAGNOSIS — I10 ESSENTIAL HYPERTENSION: ICD-10-CM

## 2022-10-17 RX ORDER — VALSARTAN AND HYDROCHLOROTHIAZIDE 160; 25 MG/1; MG/1
1 TABLET ORAL DAILY
Qty: 30 TABLET | Refills: 0 | Status: SHIPPED | OUTPATIENT
Start: 2022-10-17 | End: 2022-12-30

## 2022-12-30 RX ORDER — VALSARTAN AND HYDROCHLOROTHIAZIDE 160; 25 MG/1; MG/1
1 TABLET ORAL DAILY
Qty: 90 TABLET | Refills: 0 | Status: SHIPPED | OUTPATIENT
Start: 2022-12-30

## 2022-12-30 NOTE — TELEPHONE ENCOUNTER
Caller: Prince Wesley    Relationship: Self    Best call back number:       Requested Prescriptions:   Requested Prescriptions     Pending Prescriptions Disp Refills   • valsartan-hydrochlorothiazide (DIOVAN-HCT) 160-25 MG per tablet [Pharmacy Med Name: VALSARTAN/HCTZ 160MG/25MG TABLETS] 90 tablet      Sig: TAKE 1 TABLET BY MOUTH DAILY        Pharmacy where request should be sent: [id number] - [place], [place] - [address] - [phone] [facility] [phone] FX     Additional details provided by patient: TOOK LAST ONE LAST NIGHT, NEEDS REFILLS     Does the patient have less than a 3 day supply:  [x] Yes  [] No    Would you like a call back once the refill request has been completed: [] Yes [] No    If the office needs to give you a call back, can they leave a voicemail: [] Yes [] No    Elliott Alvarenga Rep   12/30/22 11:48 EST

## 2023-03-28 DIAGNOSIS — I10 ESSENTIAL HYPERTENSION: ICD-10-CM

## 2023-03-28 RX ORDER — VALSARTAN AND HYDROCHLOROTHIAZIDE 160; 25 MG/1; MG/1
1 TABLET ORAL DAILY
Qty: 90 TABLET | Refills: 0 | OUTPATIENT
Start: 2023-03-28